# Patient Record
Sex: FEMALE | Race: BLACK OR AFRICAN AMERICAN | Employment: UNEMPLOYED | URBAN - METROPOLITAN AREA
[De-identification: names, ages, dates, MRNs, and addresses within clinical notes are randomized per-mention and may not be internally consistent; named-entity substitution may affect disease eponyms.]

---

## 2017-02-01 DIAGNOSIS — G44.209 HEADACHE, EMOTIONAL TENSION: ICD-10-CM

## 2017-02-01 RX ORDER — ASPIRIN/ACETAMINOPHEN/CAFFEINE 250-250-65
TABLET ORAL
Qty: 50 TAB | Refills: 2 | Status: SHIPPED | OUTPATIENT
Start: 2017-02-01

## 2018-11-01 ENCOUNTER — HOSPITAL ENCOUNTER (EMERGENCY)
Age: 28
Discharge: HOME | End: 2018-11-01
Attending: STUDENT IN AN ORGANIZED HEALTH CARE EDUCATION/TRAINING PROGRAM
Payer: MEDICAID

## 2018-11-01 ENCOUNTER — APPOINTMENT (OUTPATIENT)
Dept: GENERAL RADIOLOGY | Age: 28
End: 2018-11-01
Attending: PHYSICIAN ASSISTANT
Payer: MEDICAID

## 2018-11-01 VITALS
BODY MASS INDEX: 41.45 KG/M2 | HEART RATE: 74 BPM | OXYGEN SATURATION: 100 % | DIASTOLIC BLOOD PRESSURE: 110 MMHG | HEIGHT: 66 IN | RESPIRATION RATE: 16 BRPM | TEMPERATURE: 98 F | WEIGHT: 257.94 LBS | SYSTOLIC BLOOD PRESSURE: 168 MMHG

## 2018-11-01 DIAGNOSIS — M79.671 FOOT PAIN, RIGHT: Primary | ICD-10-CM

## 2018-11-01 DIAGNOSIS — S76.212A GROIN STRAIN, LEFT, INITIAL ENCOUNTER: ICD-10-CM

## 2018-11-01 PROCEDURE — 77030036687 HC SHOE PSTOP S2SG -A

## 2018-11-01 PROCEDURE — 96372 THER/PROPH/DIAG INJ SC/IM: CPT

## 2018-11-01 PROCEDURE — 74011250636 HC RX REV CODE- 250/636: Performed by: PHYSICIAN ASSISTANT

## 2018-11-01 PROCEDURE — 73630 X-RAY EXAM OF FOOT: CPT

## 2018-11-01 PROCEDURE — 99282 EMERGENCY DEPT VISIT SF MDM: CPT

## 2018-11-01 RX ORDER — KETOROLAC TROMETHAMINE 30 MG/ML
60 INJECTION, SOLUTION INTRAMUSCULAR; INTRAVENOUS
Status: COMPLETED | OUTPATIENT
Start: 2018-11-01 | End: 2018-11-01

## 2018-11-01 RX ORDER — NAPROXEN 500 MG/1
500 TABLET ORAL
Qty: 20 TAB | Refills: 0 | Status: SHIPPED | OUTPATIENT
Start: 2018-11-01 | End: 2019-08-08

## 2018-11-01 RX ADMIN — KETOROLAC TROMETHAMINE 60 MG: 30 INJECTION, SOLUTION INTRAMUSCULAR; INTRAVENOUS at 01:58

## 2018-11-01 NOTE — ED PROVIDER NOTES
34 yo F with hx of asthma and HTN here for evaluation of right foot pain. States she has been having pain and swelling to area over the past few weeks. Worse with ambulating and standing; states worse after taking kids trick or treating tonight. No lower leg/calf pain. States also some pain to L groin; feels as if she \"pulled\" it. Denies fall. No additional symptoms. The history is provided by the patient. Foot Pain This is a recurrent problem. The current episode started more than 2 days ago. The problem occurs constantly. The problem has been gradually worsening. The pain is present in the right foot. The quality of the pain is described as aching. The pain is at a severity of 7/10. The pain is moderate. The symptoms are aggravated by palpation, activity, standing and movement. Past Medical History:  
Diagnosis Date  Asthma  Hypertension History reviewed. No pertinent surgical history. Family History:  
Problem Relation Age of Onset  No Known Problems Mother  No Known Problems Father  Diabetes Maternal Grandmother  Hypertension Maternal Grandmother Social History Socioeconomic History  Marital status: SINGLE Spouse name: Not on file  Number of children: Not on file  Years of education: Not on file  Highest education level: Not on file Social Needs  Financial resource strain: Not on file  Food insecurity - worry: Not on file  Food insecurity - inability: Not on file  Transportation needs - medical: Not on file  Transportation needs - non-medical: Not on file Occupational History  Not on file Tobacco Use  Smoking status: Never Smoker  Smokeless tobacco: Never Used Substance and Sexual Activity  Alcohol use: Yes Comment: occ  Drug use: No  
 Sexual activity: Yes  
  Partners: Male Birth control/protection: Condom, IUD Other Topics Concern  Not on file Social History Narrative  Not on file ALLERGIES: Patient has no known allergies. Review of Systems Constitutional: Negative for activity change and fever. HENT: Negative for facial swelling. Eyes: Negative for discharge. Respiratory: Negative for cough. Cardiovascular: Negative for leg swelling. Gastrointestinal: Negative for abdominal distention. Musculoskeletal: Positive for myalgias. Skin: Negative for wound. Neurological: Negative for seizures and syncope. Psychiatric/Behavioral: Negative for behavioral problems. Vitals:  
 11/01/18 0011 BP: (!) 153/99 Pulse: 99 Resp: 16 Temp: 98.1 °F (36.7 °C) SpO2: 99% Weight: 117 kg (257 lb 15 oz) Height: 5' 6\" (1.676 m) Physical Exam  
Constitutional: She is oriented to person, place, and time. She appears well-nourished. No distress. HENT:  
Head: Normocephalic and atraumatic. Eyes: Pupils are equal, round, and reactive to light. Neck: Normal range of motion. Cardiovascular: Normal rate and regular rhythm. Pulses: 
     Dorsalis pedis pulses are 2+ on the right side. Pulmonary/Chest: Effort normal and breath sounds normal.  
Abdominal: Soft. There is no tenderness. Musculoskeletal: Normal range of motion. She exhibits no edema or tenderness. Right knee: Normal.  
     Right lower leg: Normal.  
     Right foot: There is swelling. There is no bony tenderness. Neurological: She is alert and oriented to person, place, and time. Skin: Skin is warm and dry. Psychiatric: She has a normal mood and affect. Nursing note and vitals reviewed. MDM Number of Diagnoses or Management Options Foot pain, right:  
Groin strain, left, initial encounter:  
  
Amount and/or Complexity of Data Reviewed Tests in the radiology section of CPT®: ordered and reviewed Discuss the patient with other providers: yes Independent visualization of images, tracings, or specimens: yes Procedures Patient has been reassessed. Reviewed medications and radiographics with patient. Ready to discharge home. Will have podiatry followup. Discussed case with attending Physician Renetta Jara; reviewed xrays. Agrees with care and will D/C with follow up. Patient's results have been reviewed with them. Patient and/or family have verbally conveyed their understanding and agreement of the patient's signs, symptoms, diagnosis, treatment and prognosis and additionally agree to follow up as recommended or return to the Emergency Room should their condition change prior to follow-up. Discharge instructions have also been provided to the patient with some educational information regarding their diagnosis as well a list of reasons why they would want to return to the ER prior to their follow-up appointment should their condition change.  
Bernida [place], PA

## 2018-11-01 NOTE — DISCHARGE INSTRUCTIONS
Foot Pain: Care Instructions  Your Care Instructions  Foot injuries that cause pain and swelling are fairly common. Almost all sports or home repair projects can cause a misstep that ends up as foot pain. Normal wear and tear, especially as you get older, also can cause foot pain. Most minor foot injuries will heal on their own, and home treatment is usually all you need to do. If you have a severe injury, you may need tests and treatment. Follow-up care is a key part of your treatment and safety. Be sure to make and go to all appointments, and call your doctor if you are having problems. It's also a good idea to know your test results and keep a list of the medicines you take. How can you care for yourself at home? · Take pain medicines exactly as directed. ? If the doctor gave you a prescription medicine for pain, take it as prescribed. ? If you are not taking a prescription pain medicine, ask your doctor if you can take an over-the-counter medicine. · Rest and protect your foot. Take a break from any activity that may cause pain. · Put ice or a cold pack on your foot for 10 to 20 minutes at a time. Put a thin cloth between the ice and your skin. · Prop up the sore foot on a pillow when you ice it or anytime you sit or lie down during the next 3 days. Try to keep it above the level of your heart. This will help reduce swelling. · Your doctor may recommend that you wrap your foot with an elastic bandage. Keep your foot wrapped for as long as your doctor advises. · If your doctor recommends crutches, use them as directed. · Wear roomy footwear. · As soon as pain and swelling end, begin gentle exercises of your foot. Your doctor can tell you which exercises will help. When should you call for help? Call 911 anytime you think you may need emergency care.  For example, call if:    · Your foot turns pale, white, blue, or cold.    Call your doctor now or seek immediate medical care if:    · You cannot move or stand on your foot.     · Your foot looks twisted or out of its normal position.     · Your foot is not stable when you step down.     · You have signs of infection, such as:  ? Increased pain, swelling, warmth, or redness. ? Red streaks leading from the sore area. ? Pus draining from a place on your foot. ? A fever.     · Your foot is numb or tingly.    Watch closely for changes in your health, and be sure to contact your doctor if:    · You do not get better as expected.     · You have bruises from an injury that last longer than 2 weeks. Where can you learn more? Go to http://cathleen-irina.info/. Enter K078 in the search box to learn more about \"Foot Pain: Care Instructions. \"  Current as of: November 29, 2017  Content Version: 11.8  © 8499-7174 [employer]. Care instructions adapted under license by [employer] (which disclaims liability or warranty for this information). If you have questions about a medical condition or this instruction, always ask your healthcare professional. [address] any warranty or liability for your use of this information.

## 2018-11-01 NOTE — ED TRIAGE NOTES
Patient arrives ambulatory from home with c/o R foot pain and swelling. Pain has been intermittent x1 month and worsened tonight after walking around. Pt also reports L hip pain that began tonight after walking.

## 2018-11-01 NOTE — LETTER
[address] 
[address] [place], [place] [address]s[facility] [id number] 
[phone] Work/School Note Date: 11/1/2018 To Whom It May concern: Kannan Johnson was seen and treated today in the emergency room by the following provider(s): 
Attending Provider: Merlin Flores MD 
Physician Assistant: JESSICA Cadena. Kannan Johnson may return to work on 11/2. Sincerely, Pamela White

## 2019-07-16 ENCOUNTER — HOSPITAL ENCOUNTER (EMERGENCY)
Age: 29
Discharge: HOME | End: 2019-07-16
Attending: EMERGENCY MEDICINE
Payer: MEDICAID

## 2019-07-16 ENCOUNTER — APPOINTMENT (OUTPATIENT)
Dept: GENERAL RADIOLOGY | Age: 29
End: 2019-07-16
Attending: EMERGENCY MEDICINE
Payer: MEDICAID

## 2019-07-16 VITALS
SYSTOLIC BLOOD PRESSURE: 146 MMHG | HEART RATE: 98 BPM | TEMPERATURE: 98.4 F | BODY MASS INDEX: 42.77 KG/M2 | WEIGHT: 265 LBS | DIASTOLIC BLOOD PRESSURE: 94 MMHG | OXYGEN SATURATION: 99 % | RESPIRATION RATE: 18 BRPM

## 2019-07-16 DIAGNOSIS — L03.116 CELLULITIS OF LEFT LOWER EXTREMITY: Primary | ICD-10-CM

## 2019-07-16 PROCEDURE — 73590 X-RAY EXAM OF LOWER LEG: CPT

## 2019-07-16 PROCEDURE — 99282 EMERGENCY DEPT VISIT SF MDM: CPT

## 2019-07-16 RX ORDER — CLINDAMYCIN HYDROCHLORIDE 300 MG/1
300 CAPSULE ORAL 3 TIMES DAILY
Qty: 21 CAP | Refills: 0 | Status: SHIPPED | OUTPATIENT
Start: 2019-07-16 | End: 2019-07-23

## 2019-07-16 NOTE — ED NOTES
Pt ambulatory out of ED with discharge instructions and prescriptions in hand given by Dr. Ramses Gao; pt verbalized understanding of discharge paperwork and time allotted for questions. VSS. Pt alert and oriented.

## 2019-07-16 NOTE — DISCHARGE INSTRUCTIONS

## 2019-07-16 NOTE — ED PROVIDER NOTES
HPI       33y F here with redness and swelling to the L shin. Hit it on a chair 5 days ago. Now the area has redness around it and is painful. No fever. No vomiting. No pain with ambulation. No injuries elsewhere. No drainage. Past Medical History:   Diagnosis Date    Asthma     Hypertension        No past surgical history on file.       Family History:   Problem Relation Age of Onset    No Known Problems Mother     No Known Problems Father     Diabetes Maternal Grandmother     Hypertension Maternal Grandmother        Social History     Socioeconomic History    Marital status: SINGLE     Spouse name: Not on file    Number of children: Not on file    Years of education: Not on file    Highest education level: Not on file   Occupational History    Not on file   Social Needs    Financial resource strain: Not on file    Food insecurity:     Worry: Not on file     Inability: Not on file    Transportation needs:     Medical: Not on file     Non-medical: Not on file   Tobacco Use    Smoking status: Never Smoker    Smokeless tobacco: Never Used   Substance and Sexual Activity    Alcohol use: Yes     Comment: occ    Drug use: No    Sexual activity: Yes     Partners: Male     Birth control/protection: Condom, IUD   Lifestyle    Physical activity:     Days per week: Not on file     Minutes per session: Not on file    Stress: Not on file   Relationships    Social connections:     Talks on phone: Not on file     Gets together: Not on file     Attends Jehov[employer]'s witness service: Not on file     Active member of club or organization: Not on file     Attends meetings of clubs or organizations: Not on file     Relationship status: Not on file    Intimate partner violence:     Fear of current or ex partner: Not on file     Emotionally abused: Not on file     Physically abused: Not on file     Forced sexual activity: Not on file   Other Topics Concern    Not on file   Social History Narrative    Not on file ALLERGIES: Patient has no known allergies. Review of Systems   Review of Systems   Constitutional: (-) weight loss. HEENT: (-) stiff neck   Eyes: (-) discharge. Respiratory: (-) cough. Cardiovascular: (-) syncope. Gastrointestinal: (-) blood in stool. Genitourinary: (-) hematuria. Musculoskeletal: (-) myalgias. Neurological: (-) seizure. Skin: (-) petechiae  Lymph/Immunologic: (-) enlarged lymph nodes  All other systems reviewed and are negative. There were no vitals filed for this visit. Physical Exam Nursing note and vitals reviewed. Constitutional: oriented to person, place, and time. appears well-developed and well-nourished. No distress. Head: Normocephalic and atraumatic. Sclera anicteric  Nose: No rhinorrhea  Mouth/Throat: Oropharynx is clear and moist. Pharynx normal  Eyes: Conjunctivae are normal. Pupils are equal, round, and reactive to light. Right eye exhibits no discharge. Left eye exhibits no discharge. Neck: Painless normal range of motion. Neck supple. No LAD. Cardiovascular: Normal rate, regular rhythm, normal heart sounds and intact distal pulses. Exam reveals no gallop and no friction rub. No murmur heard. Pulmonary/Chest:  No respiratory distress. No wheezes. No rales. No rhonchi. No increased work of breathing. No accessory muscle use. Good air exchange throughout. Abdominal: soft, non-tender, no rebound or guarding. No hepatosplenomegaly. Normal bowel sounds throughout. Back: no tenderness to palpation, no deformities, no CVA tenderness  Extremities/Musculoskeletal: Normal range of motion. Abrasion to the ant L shin with surrounding erythema and warmth. No drainage or discharge. Some bruising and swelling is present. Distal extremities are neurovasc intact. Lymphadenopathy:   No adenopathy. Neurological:  Alert and oriented to person, place, and time. Coordination normal. CN 2-12 intact. Motor and sensory function intact.   Skin: Skin is warm and dry. No rash noted. No pallor. MDM 33y F here with L shin injury. Will check xray. Will need abx as it looks like there is cellulitis. No systemic signs of illness.        Procedures

## 2019-07-16 NOTE — ED TRIAGE NOTES
Pt arrives with wound to L shin after hitting it on a chair at home on Thursday. Redness to surrounding area.

## 2019-08-08 ENCOUNTER — HOSPITAL ENCOUNTER (OUTPATIENT)
Dept: WOUND CARE | Age: 29
Discharge: HOME | End: 2019-08-08
Payer: MEDICAID

## 2019-08-08 VITALS
SYSTOLIC BLOOD PRESSURE: 136 MMHG | DIASTOLIC BLOOD PRESSURE: 93 MMHG | TEMPERATURE: 97.8 F | HEART RATE: 93 BPM | RESPIRATION RATE: 18 BRPM

## 2019-08-08 PROBLEM — L97.929 IDIOPATHIC CHRONIC VENOUS HYPERTENSION OF LEFT LOWER EXTREMITY WITH ULCER (HCC): Status: ACTIVE | Noted: 2019-08-08

## 2019-08-08 PROBLEM — S81.812A LACERATION OF LEFT LOWER EXTREMITY: Status: ACTIVE | Noted: 2019-08-08

## 2019-08-08 PROBLEM — L97.923 NON-PRESSURE CHRONIC ULCER OF LEFT LOWER LEG WITH NECROSIS OF MUSCLE (HCC): Status: ACTIVE | Noted: 2019-08-08

## 2019-08-08 PROBLEM — I87.312 IDIOPATHIC CHRONIC VENOUS HYPERTENSION OF LEFT LOWER EXTREMITY WITH ULCER (HCC): Status: ACTIVE | Noted: 2019-08-08

## 2019-08-08 PROCEDURE — 74011000250 HC RX REV CODE- 250: Performed by: OTOLARYNGOLOGY

## 2019-08-08 PROCEDURE — 11043 DBRDMT MUSC&/FSCA 1ST 20/<: CPT

## 2019-08-08 RX ORDER — UREA 10 %
LOTION (ML) TOPICAL DAILY
COMMUNITY

## 2019-08-08 RX ORDER — SPIRONOLACTONE 25 MG/1
100 TABLET ORAL DAILY
COMMUNITY

## 2019-08-08 RX ADMIN — Medication: at 15:00

## 2019-08-08 NOTE — DISCHARGE INSTRUCTIONS
Discharge Instructions for  [address] [facility], [address] [place] [address] Avenue  Telephone: [phone]    NAME:  Isidro Field OF BIRTH:  1990  MEDICAL RECORD NUMBER:  [id number]  DATE:  8/8/2019    Wound Cleansing:   Do not scrub or use excessive force. Cleanse wound prior to applying a clean dressing with:  [] Normal Saline [] Keep Wound Dry in Shower    [] Wound Cleanser   [x] Cleanse wound with Mild Soap & Water  [] May Shower at Discharge   [] Other:       Topical Treatments:  Do not apply lotions, creams, or ointments to wound bed unless directed. [x] Apply moisturizing lotion to skin surrounding the wound prior to dressing change.  [] Apply antifungal ointment to skin surrounding the wound prior to dressing change.  [] Apply thin film of moisture barrier ointment to skin immediately around wound. [] Other:       Dressings:           Wound Location left lower leg  [x] Apply Primary Dressing:       [] MediHoney Gel [x] Alginate with Silver aquacel ag [] Alginate   [] Collagen [] Collagen with Silver   [] Santyl with Moisten saline gauze     [] Hydrocolloid   [] MediHoney Alginate [] Foam with Silver   [] Foam   [] Hydrofera Blue    [] Mepilex Border    [] Moisten with Saline [] Hydrogel [] Mepitel     [] Bactroban/Mupirocin [] Polysporin  [] Other:    [] Pack wound loosely with  [] Iodoform   [] Plain Packing  [] Other   [x] Cover and Secure with:     [] Gauze [] Ryland [] Kerlix   [] Ace Wrap [] Cover Roll Tape [x] ABD     [] Other:    Avoid contact of tape with skin. [] Change dressing: [] Daily    [] Every Other Day [] Three times per week   [] Once a week [x] Do Not Change Dressing   [x] Other: in clinic      Compression:  Apply: [x] Multilayer Compression Wrap Applied in Clinic 3 Layer []RightLeg [x]Left Leg   [x] Multi-layer compression. Do not get leg(s) with wrap wet.   If wraps become too tight call the center or completely remove the wrap.      [x] Elevate leg(s) above the level of the heart when sitting. [x] Avoid prolonged standing in one place     Dietary:  [x] Diet as tolerated: [] Calorie Diabetic Diet: [] No Added Salt:  [x] Increase Protein: [] Other:   Activity:  [x] Activity as tolerated:  [] Patient has no activity restrictions     [] Strict Bedrest: [] Remain off Work:     [] May return to full duty work:                                   [] Return to work with restrictions:             Return Appointment:  [] Wound and dressing supply provider:   [] ECF or Home Healthcare:  [] Wound Assessment: [] Physician or NP scheduled for Wound Assessment:   [x] Return Appointment: With Dr. Rosio Tapia  in  [facility]) RN visit 8/12/19 for wrap change  [] Ordered tests:      Electronically signed on 8/8/2019 at 2:34 PM     Lore[address]: Should you experience any significant changes in your wound(s) or have questions about your wound care, please contact the [facility] at [address] [place] [address] 8:00 am - 4:30. If you need help with your wound outside these hours and cannot wait until we are again available, contact your PCP or go to the hospital emergency room. PLEASE NOTE: IF YOU ARE UNABLE TO OBTAIN WOUND SUPPLIES, CONTINUE TO USE THE SUPPLIES YOU HAVE AVAILABLE UNTIL YOU ARE ABLE TO REACH US. IT IS MOST IMPORTANT TO KEEP THE WOUND COVERED AT ALL TIMES.      Physician Signature:_______________________    Date: ___________ Time:  ____________

## 2019-08-08 NOTE — H&P
[facility]  HISTORY AND PHYSICAL    Name:  Parul Warren  MR#:  [id number]  :  1990  ACCOUNT #:  [de-identified]  ADMIT DATE:  2019      HISTORY OF PRESENT ILLNESS:  The patient is a 54-year-old woman who walked into a chair in her bedroom on  and struck her left leg. She then went to the emergency room on  for erythema and increasing tenderness. X-ray showed no fracture or other acute osseous, articular, or soft tissue abnormalities. She was treated with clindamycin 300 mg 3 times a day for 7 days and that course has been completed. PAST MEDICAL HISTORY:  Hypertension, asthma, anxiety. PAST SURGICAL HISTORY:  IUD insertion. MEDICATIONS:  Saxenda and Zoloft. FAMILY HISTORY:  Diabetes mellitus in her maternal grandmother, hypertension in maternal grandmother    SOCIAL HISTORY:  The patient denies smoking cigarettes but she does consume alcohol. ALLERGIES:  SHE HAS NO KNOWN DRUG ALLERGIES. WOUND EXAMINATION:  The patient has been treating this wound with triple antibiotic ointment. She is very aware that she has had persistent edema for quite a long while. PHYSICAL EXAMINATION:  VITAL SIGNS:  The patients 5 feet 5 inches in height, weight is 248 pounds which is a BMI of 41.4. Temperature is 97.8, pulse 93, respirations 18, blood pressure 136/93. NEUROLOGIC:  Cranial nerves II through XII grossly intact, other than a slight lid lag on the left side. HEAD AND NECK:  Oral cavity, oropharynx, palpation of neck is normal.  LUNGS:  Clear to auscultation and percussion. HEART:  Regular rhythm without murmur. ABDOMEN:  Soft and nontender, no organomegaly. BACK:  Nontender to palpation. EXTREMITIES:  Upper extremities, equal tone and strength bilaterally. Lower extremities: she has nice palpable dorsalis pedis pulses. She refused an OBDULIO due to pain in the wound of the left anterior leg. She does have pitting edema bilaterally.   Left anterior leg has a wound of 3.4 x 4.6 x 0.5 cm with areas of healthy granulation tissue, areas of healthy muscle, and areas of slough which are quite extensive and extend down to the underlying muscle. I have recommended that the wound be debrided of all devitalized tissue. I explained the risks and benefits. The patient understood and wished to proceed. Therefore, topical Xylocaine 4% gel was applied to the wound for 10 minutes. Using a 5-mm ring curette, all devitalized tissue was removed down to underlying healthy muscle. Upon completion of the procedure, the dimensions increased to 3.5 x 5.0 x 0.6 cm. All devitalized tissue had been removed and only nice healthy fat, fascia, and muscle are visible in the wound. The wound was then scrubbed with chlorhexidine followed by normal saline. ASSESSMENT AND PLAN:  We are going to start with Aquacel Ag and a 3-layer wrap. I have recommended that the patient keep her leg elevated at all times. I showed her how to do calf muscle exercises to improve venous outflow. I explained to her that if the wrap hurts or if it becomes saturated or if her toes get dusky, she should remove the wrap. We will plan on seeing her shelter through the week for a nursing visit to simply change the wrap since it might very well be saturated or too loose for her leg at that time. I will see again in followup in 1 week. We will reevaluate her wound at that time and determine whether or not she should stay with the same dressing or switch to a different modality. All questions were answered. Her condition on discharge is stable.         MD SHEYLA [email or web address]  D:  08/08/2019 15:01  T:  08/08/2019 15:09  JOB #:  [id number]

## 2019-08-08 NOTE — WOUND CARE
Visit Vitals BP (!) 136/93 (BP 1 Location: Right arm, BP Patient Position: Sitting) Pulse 93 Temp 97.8 °F (36.6 °C) Resp 18 Breastfeeding? No  
 
 
 08/08/19 1413 Wound Leg lower Anterior Date First Assessed/Time First Assessed: 08/08/19 1413   Present on Hospital Admission: Yes  Primary Wound Type: Trauma  Location: Leg lower  Wound Location Orientation: Anterior Dressing Status Breakthrough drainage Dressing Type Non adherent; Adhesive wound dressing (Band-Aid) Wound Length (cm) 3.4 cm Wound Width (cm) 4.6 cm Wound Depth (cm) 0.5 cm Wound Volume (cm^3) 7.82 cm^3 Condition of Base Pink;Granulation;Eschar  
Condition of Edges Closed Drainage Amount Small Drainage Color Serosanguinous Wound Odor None Kari-wound Assessment Edema; Non-blanchable erythema Cleansing and Cleansing Agents  Normal saline

## 2019-08-22 ENCOUNTER — HOSPITAL ENCOUNTER (OUTPATIENT)
Dept: WOUND CARE | Age: 29
Discharge: HOME | End: 2019-08-22
Payer: MEDICAID

## 2019-08-22 VITALS
RESPIRATION RATE: 18 BRPM | TEMPERATURE: 98.5 F | SYSTOLIC BLOOD PRESSURE: 139 MMHG | HEART RATE: 97 BPM | DIASTOLIC BLOOD PRESSURE: 108 MMHG

## 2019-08-22 PROCEDURE — 74011000250 HC RX REV CODE- 250: Performed by: OTOLARYNGOLOGY

## 2019-08-22 PROCEDURE — 11042 DBRDMT SUBQ TIS 1ST 20SQCM/<: CPT

## 2019-08-22 RX ADMIN — Medication: at 14:00

## 2019-08-22 NOTE — PROGRESS NOTES
08/22/19 1310   Wound Leg lower Anterior   Date First Assessed/Time First Assessed: 08/08/19 1413   Present on Hospital Admission: Yes  Primary Wound Type: Trauma  Location: Leg lower  Wound Location Orientation: Anterior   Wound Length (cm) 3.2 cm   Wound Width (cm) 4 cm   Wound Depth (cm) 0.5 cm   Wound Volume (cm^3) 6.4 cm^3   Condition of Base Pink;Slough   Drainage Amount Small   Drainage Color Serosanguinous   Wound Odor None   Cleansing and Cleansing Agents  Dermal wound cleanser     Visit Vitals  BP (!) 139/108   Pulse 97   Temp 98.5 °F (36.9 °C)   Resp 18

## 2019-08-22 NOTE — WOUND CARE
08/22/19 1336   Wound Leg lower Anterior   Date First Assessed/Time First Assessed: 08/08/19 1413   Present on Hospital Admission: Yes  Primary Wound Type: Trauma  Location: Leg lower  Wound Location Orientation: Anterior   Dressing Type Applied Alginate; Compression Wrap/Venous Stasis       Discharge Condition: stable  Ambulatory Status: ambulatory  Discharge Destination: home  Transportation:  Personal car  Accompanied by:  self

## 2019-08-22 NOTE — DISCHARGE INSTRUCTIONS
Discharge Instructions for  [address] [facility], [facility] [address]  Telephone: [phone]    NAME:  Tamara Choi OF BIRTH:  1990  MEDICAL RECORD NUMBER:  [id number]  DATE:  8/22/2019    Wound Cleansing:   Do not scrub or use excessive force. Cleanse wound prior to applying a clean dressing with:  [] Normal Saline [] Keep Wound Dry in Shower    [] Wound Cleanser   [x] Cleanse wound with Mild Soap & Water  [] May Shower at Discharge   [] Other:       Topical Treatments:  Do not apply lotions, creams, or ointments to wound bed unless directed. [x] Apply moisturizing lotion to skin surrounding the wound prior to dressing change.  [] Apply antifungal ointment to skin surrounding the wound prior to dressing change.  [] Apply thin film of moisture barrier ointment to skin immediately around wound. [] Other:       Dressings:           Wound Location left lower leg  [] Apply Primary Dressing:       [] MediHoney Gel [x] Alginate with Silver [] Alginate   [] Collagen [] Collagen with Silver   [] Santyl with Moisten saline gauze     [] Hydrocolloid   [] MediHoney Alginate [] Foam with Silver   [] Foam   [] Hydrofera Blue    [] Mepilex Border    [] Moisten with Saline [] Hydrogel [] Mepitel     [] Bactroban/Mupirocin [] Polysporin  [] Other:    [] Pack wound loosely with  [] Iodoform   [] Plain Packing  [] Other   [] Cover and Secure with:     [] Gauze [] Ryland [] Kerlix   [] Ace Wrap [] Cover Roll Tape [x] ABD     [] Other:    Avoid contact of tape with skin. [] Change dressing: [] Daily    [] Every Other Day [] Three times per week   [x] Once a week [] Do Not Change Dressing   [] Other:        Compression:  Apply: [x] Multilayer Compression Wrap Applied in Clinic 3 layer wrap []RightLeg []Left Leg   [] Multi-layer compression. Do not get leg(s) with wrap wet. If wraps become too tight call the center or completely remove the wrap.       [x] Elevate leg(s) above the level of the heart when sitting. [x] Avoid prolonged standing in one place. OfDietary:  [x] Diet as tolerated: [] Calorie Diabetic Diet: [] No Added Salt:  [] Increase Protein: [] Other:   Activity:  [x] Activity as tolerated:  [] Patient has no activity restrictions     [] Strict Bedrest: [] Remain off Work:     [] May return to full duty work:                                   [] Return to work with restrictions:             Return Appointment:  [] Wound and dressing supply provider:   [] ECF or Home Healthcare:  [] Wound Assessment: [] Physician or NP scheduled for Wound Assessment:   [x] Return Appointment: With Viktoriya Pryor  in  1 [facility])  [] Ordered tests:      Electronically signed on 8/22/2019 at 1:19 PM     215 [facility], [place]) Information: Should you experience any significant changes in your wound(s) or have questions about your wound care, please contact the [facility] Main at [address] [place] [address] 8:00 am - 4:30. If you need help with your wound outside these hours and cannot wait until we are again available, contact your PCP or go to the hospital emergency room. PLEASE NOTE: IF YOU ARE UNABLE TO OBTAIN WOUND SUPPLIES, CONTINUE TO USE THE SUPPLIES YOU HAVE AVAILABLE UNTIL YOU ARE ABLE TO REACH US. IT IS MOST IMPORTANT TO KEEP THE WOUND COVERED AT ALL TIMES.      Physician Signature:_______________________    Date: ___________ Time:  ____________

## 2019-08-22 NOTE — PROGRESS NOTES
[address] [place] [address] NOTE    Name:  Debe Severance  MR#:  [id number]  :  1990  ACCOUNT #:  [de-identified]  DATE OF SERVICE:  2019      SUBJECTIVE:  The patient returns for the first time since she was seen for her history and physical on 2019. She had a laceration of the left leg without foreign body, S81.812D, which extended down to muscle, L97.923, with a venous hypertension ulcer, I87.312. The patient was unable to keep any followup appointments here; therefore she removed the three-layer wrap on Monday, 2019, and since then has been using Aquacel Ag and an Ace wrap. There had been no other problems. No changes in medications, allergies or review of systems. OBJECTIVE:  Her temperature today is 98.5, pulse 97, respirations 18, blood pressure 139/108. WOUND EXAMINATION:  The wound last time measured 3.4 x 4.6 x 0.5 cm. Today, it measures 3.2 x 4 x 0.5 cm. There are areas of greatest depth in the center of the wound extending down to the muscle. There is a layer of slough over the wound, and there is no periwound erythema nor maceration. It was recommended that the wound be debrided. I explained the risks and benefits. The patient understood and wished to proceed. Therefore, topical Xylocaine 4% gel was applied for 10 minutes. Using a 5-mm ring curette, all devitalized tissue was removed down to healthy subcutaneous tissue and fat. Hemostasis was achieved with gentle pressure until dry. The wound was scrubbed with chlorhexidine followed by normal saline. ASSESSMENT AND PLAN:  I reiterated to the patient the need to keep a three-layer wrap in place to allow adequate compression along with leg elevation and calf muscle exercises. Therefore, we are going to apply Aquacel Ag, followed by three-layer wrap which should stay in place until we see the patient again in 1 week. All questions were answered.   Her condition on discharge is stable.         MD SHEYLA [email or web address]  D:  08/22/2019 13:30  T:  08/22/2019 13:38  JOB #:  [id number]

## 2019-08-29 ENCOUNTER — HOSPITAL ENCOUNTER (OUTPATIENT)
Dept: WOUND CARE | Age: 29
Discharge: HOME | End: 2019-08-29
Payer: MEDICAID

## 2019-08-29 VITALS
TEMPERATURE: 98.1 F | HEART RATE: 109 BPM | DIASTOLIC BLOOD PRESSURE: 86 MMHG | SYSTOLIC BLOOD PRESSURE: 136 MMHG | RESPIRATION RATE: 16 BRPM

## 2019-08-29 PROCEDURE — 11042 DBRDMT SUBQ TIS 1ST 20SQCM/<: CPT

## 2019-08-29 NOTE — DISCHARGE INSTRUCTIONS
Discharge Instructions for  [address] [facility], [address] [place] [address] Avenue  Telephone: [phone]    NAME:  Freddy March OF BIRTH:  1990  MEDICAL RECORD NUMBER:  [id number]  DATE:  8/29/2019    Wound Cleansing:   Do not scrub or use excessive force. Cleanse wound prior to applying a clean dressing with:  [] Normal Saline [] Keep Wound Dry in Shower    [] Wound Cleanser   [x] Cleanse wound with Mild Soap & Water  [] May Shower at Discharge   [] Other:       Topical Treatments:  Do not apply lotions, creams, or ointments to wound bed unless directed. [x] Apply moisturizing lotion to skin surrounding the wound prior to dressing change.  [] Apply antifungal ointment to skin surrounding the wound prior to dressing change.  [] Apply thin film of moisture barrier ointment to skin immediately around wound. [] Other:       Dressings:           Wound Location left lower leg  [x] Apply Primary Dressing:       [] MediHoney Gel [] Alginate with Silver [] Alginate   [] Collagen [x] Collagen with Silver leslie ag   [] Santyl with Moisten saline gauze     [] Hydrocolloid   [] MediHoney Alginate [] Foam with Silver   [] Foam   [x] Hydrofera Blue ready [] Mepilex Border    [] Moisten with Saline [] Hydrogel [] Mepitel     [] Bactroban/Mupirocin [] Polysporin  [] Other:    [x] Cover and Secure with:     [] Gauze [] Ryland [] Kerlix   [] Ace Wrap [] Cover Roll Tape [x] ABD     [] Other:    Avoid contact of tape with skin. [x] Change dressing: [] Daily    [] Every Other Day [] Three times per week   [x] Once a week [] Do Not Change Dressing   [] Other:    Compression:  Apply: [x] Multilayer Compression Wrap Applied in Clinic 3 layer []RightLeg [x]Left Leg   [x] Multi-layer compression. Do not get leg(s) with wrap wet. If wraps become too tight call the center or completely remove the wrap.       [x] Elevate leg(s) above the level of the heart when sitting. [x] Avoid prolonged standing in one place. Dietary:  [x] Diet as tolerated: [] Calorie Diabetic Diet: [] No Added Salt:  [x] Increase Protein: [] Other:   Activity:  [x] Activity as tolerated:  [] Patient has no activity restrictions     [] Strict Bedrest: [] Remain off Work:     [] May return to full duty work:                                   [] Return to work with restrictions:             Return Appointment:  [] Wound and dressing supply provider:   [] ECF or Home Healthcare:  [x] Wound Assessment: RN visit for wrap change [] Physician or NP scheduled for Wound Assessment:   [x] Return Appointment: With Dr. Marcelo Cordon  in  2 [facility])  [] Ordered tests:      Electronically signed on 8/29/2019 at 1:18 PM     215 [facility] Information: Should you experience any significant changes in your wound(s) or have questions about your wound care, please contact the [facility] Main at [address] [place] [address] 8:00 am - 4:30. If you need help with your wound outside these hours and cannot wait until we are again available, contact your PCP or go to the hospital emergency room. PLEASE NOTE: IF YOU ARE UNABLE TO OBTAIN WOUND SUPPLIES, CONTINUE TO USE THE SUPPLIES YOU HAVE AVAILABLE UNTIL YOU ARE ABLE TO REACH US. IT IS MOST IMPORTANT TO KEEP THE WOUND COVERED AT ALL TIMES.       Physician Signature:_______________________    Date: ___________ Time:  ____________

## 2019-08-29 NOTE — WOUND CARE
Visit Vitals  /86 (BP 1 Location: Right arm, BP Patient Position: Sitting)   Pulse (!) 109   Temp 98.1 °F (36.7 °C)   Resp 16        08/29/19 1310   Wound Leg lower Anterior   Date First Assessed/Time First Assessed: 08/08/19 1413   Present on Hospital Admission: Yes  Primary Wound Type: Trauma  Location: Leg lower  Wound Location Orientation: Anterior   Wound Length (cm) 3 cm   Wound Width (cm) 4 cm   Wound Depth (cm) 0.4 cm   Wound Volume (cm^3) 4.8 cm^3   Condition of Base Granulation   Drainage Amount Small   Drainage Color Serosanguinous   Wound Odor None   Kari-wound Assessment Induration   Cleansing and Cleansing Agents  Normal saline

## 2019-08-29 NOTE — PROGRESS NOTES
[address] [place] [address] PROGRESS NOTE    Name:  Radha Keita  MR#:  [id number]  :  1990  ACCOUNT #:  [de-identified]  DATE OF SERVICE:  2019      SUBJECTIVE:  The patient returns for treatment of a laceration without foreign body in the left leg, S81.812D, with a venous hypertension ulcer, I87.312, which extended down to muscle, L97.923. Last week, we wrapped the leg with a 3-layer wrap. She kept it on for most of the week and removed it yesterday. She has been trying to keep her legs elevated as much as possible and do gastrocnemius muscle exercises. She removed the 3-layer wrap because it was too tight, which I explained to her it was due to keeping her leg in a dependent position. She had no other problems over the week. No changes in medications, allergies, or review of systems. OBJECTIVE:  VITAL SIGNS:  Her temperature is 98.1, pulse 109, respirations 16, blood pressure 136/86. WOUND EXAMINATION:  The wound dimensions last week were 3.2 x 4 x 0.5 cm. Today, it is improved in all dimensions at 3 x 4 x 0.4 cm. There is exposed fat and between the fat, there is devitalized tissue in the deepest portion of the wound. That said ,there is a large portion of the wound which is nice healthy, red granulation tissue. It is recommended that the wound be debrided of all devitalized tissue. I explained the risks and benefits. The patient understood and wished to proceed. Therefore, topical Xylocaine 4% gel was applied for 10 minutes. Using a 5-mm ring curette, all devitalized tissue was removed from the depths of the wound down to subcutaneous tissue, essentially down to muscle. The edges were lightly stripped out to get back to healthy actively growing cells. Post debridement dimensions were 3.1 x 4.1 x 0.5 cm. The wound was scrubbed with chlorhexidine followed by normal saline.     ASSESSMENT AND PLAN:  We are going to switch from Aquacel to Ayla Ag and Hydrofera Blue followed by a 3-layer wrap. The patient will keep her legs elevated. She will do calf muscle exercises hourly while awake. I am asking the patient to purchase 20-30 mmHg stockings to start using on the right leg, which is edematous. She understands that if she has to remove her 3-layer wrap, she will do so and apply the 20-30 mmHg stockings. She can get open toe or closed toe. She wants to get a pedicure, and I recommended that she have this done next Thursday morning prior to her visit, so that she can remove her wrap, have a pedicure and present here for her exam and dressing change later that day. She will have a nursing visit in 1 week and a followup exam with me in 2 weeks. All questions were answered. Her condition on discharge is stable.         Jagdeep Boudreaux MD      [email or web address]  D:  08/29/2019 13:29  T:  08/29/2019 13:37  JOB #:  [id number]

## 2019-08-30 NOTE — WOUND CARE
08/29/19 1345   Wound Leg lower Anterior   Date First Assessed/Time First Assessed: 08/08/19 1413   Present on Hospital Admission: Yes  Primary Wound Type: Trauma  Location: Leg lower  Wound Location Orientation: Anterior   Dressing Type Applied ABD pad;Collagens/cell matrix; Compression Wrap/Venous Stasis; Foam   Procedure Tolerated Well   Discharge Condition:Stable  Ambulatory Status:ambulatory  Discharge Destination:home  Transportation: private auto  Accompanied by: children

## 2019-09-05 ENCOUNTER — HOSPITAL ENCOUNTER (OUTPATIENT)
Dept: WOUND CARE | Age: 29
Discharge: HOME | End: 2019-09-05
Payer: MEDICAID

## 2019-09-05 VITALS
TEMPERATURE: 98 F | SYSTOLIC BLOOD PRESSURE: 127 MMHG | DIASTOLIC BLOOD PRESSURE: 88 MMHG | HEART RATE: 80 BPM | RESPIRATION RATE: 18 BRPM

## 2019-09-05 PROCEDURE — 29581 APPL MULTLAYER CMPRN SYS LEG: CPT

## 2019-09-05 NOTE — WOUND CARE
09/05/19 1131   Wound Leg lower Anterior; Left   Date First Assessed/Time First Assessed: 08/08/19 1413   Present on Hospital Admission: Yes  Primary Wound Type: Trauma  Location: Leg lower  Wound Location Orientation: Anterior; Left   Dressing Status Old drainage   Dressing Type Compression Wrap/Venous Stasis;Collagens/cell matrix  (HFBR)   Wound Length (cm) 2.8 cm   Wound Width (cm) 3.7 cm   Wound Depth (cm) 0.4 cm   Wound Volume (cm^3) 4.14 cm^3   Condition of Base Granulation   Drainage Amount Small   Drainage Color Serosanguinous   Wound Odor None   Kari-wound Assessment Hyperpigmented   Dressing Type Applied ABD pad;Compression Wrap/Venous Stasis;Collagens/cell matrix  (HFBR)   Procedure Tolerated Well     Visit Vitals  /88   Pulse 80   Temp 98 °F (36.7 °C)   Resp 18

## 2019-09-12 ENCOUNTER — HOSPITAL ENCOUNTER (OUTPATIENT)
Dept: WOUND CARE | Age: 29
Discharge: HOME | End: 2019-09-12
Payer: MEDICAID

## 2019-09-12 VITALS
SYSTOLIC BLOOD PRESSURE: 132 MMHG | HEART RATE: 109 BPM | TEMPERATURE: 98.5 F | RESPIRATION RATE: 16 BRPM | DIASTOLIC BLOOD PRESSURE: 85 MMHG

## 2019-09-12 PROCEDURE — 11042 DBRDMT SUBQ TIS 1ST 20SQCM/<: CPT

## 2019-09-12 PROCEDURE — 74011000250 HC RX REV CODE- 250: Performed by: OTOLARYNGOLOGY

## 2019-09-12 RX ADMIN — Medication: at 15:00

## 2019-09-12 NOTE — WOUND CARE
09/12/19 1402   Wound Leg lower Anterior; Left   Date First Assessed/Time First Assessed: 08/08/19 1413   Present on Hospital Admission: Yes  Primary Wound Type: Trauma  Location: Leg lower  Wound Location Orientation: Anterior; Left   Wound Length (cm) 1.5 cm   Wound Width (cm) 2.5 cm   Wound Depth (cm) 0.2 cm   Wound Volume (cm^3) 0.75 cm^3   Condition of Base Granulation   Drainage Amount Small   Drainage Color Serosanguinous   Wound Odor None   Kari-wound Assessment Hyperpigmented   Cleansing and Cleansing Agents  Soap and water     Visit Vitals  /85   Pulse (!) 109   Temp 98.5 °F (36.9 °C)   Resp 16

## 2019-09-12 NOTE — WOUND CARE
09/12/19 1435   Wound Leg lower Anterior; Left   Date First Assessed/Time First Assessed: 08/08/19 1413   Present on Hospital Admission: Yes  Primary Wound Type: Trauma  Location: Leg lower  Wound Location Orientation: Anterior; Left   Dressing Type Applied ABD pad;Collagens/cell matrix; Absorptive  (Hfbr)     Discharge Condition:stable  Ambulatory Status:walking  Discharge Destination:home  Transportation: private  Accompanied by: self

## 2019-09-12 NOTE — PROGRESS NOTES
[address] [place] [address] PROGRESS NOTE    Name:  Nara Patel  MR#:  [id number]  :  1990  ACCOUNT #:  [de-identified]  DATE OF SERVICE:  2019      SUBJECTIVE:  The patient returns for treatment of a laceration without foreign body of the left leg, S81.812D with a venous hypertension ulcer, I87.312, which goes down to muscle, L97.923. On , the patient had a wound of 3 x 4 x 0.4 cm. We switched her dressing to Ayla Ag and Hydrofera blue and a three-layer wrap. Last week for her nursing visit, the wound improved to 2.8 x 3.7 x 0.4 cm. She has had a good 2 weeks. She has had no problems and no changes in medications, allergies, or review of systems. OBJECTIVE:  VITAL SIGNS:  Her temperature today is 98.5, pulse 109, respirations 16, blood pressure 132/85. WOUND EXAMINATION:  The wound dimensions today are improved to 1.5 x 2.5 x 0.2 cm. Most of the wound is healthy granulation tissue. There are some clefts between the granulation tissue down to subcutaneous tissue, which were filled with slough. It was recommended that the wound be debrided. I explained the risks and benefits. The patient understood and wished to proceed. Therefore, topical Xylocaine 4% gel was applied for 10 minutes. Using a 5-mm ring curette, the wound was debrided of all devitalized tissue. Nice healthy bleeding subcutaneous tissue was encountered. Post-debridement dimensions were 1.6 x 2.5 x 0.2 cm. The wound was then scrubbed with chlorhexidine followed by normal saline. ASSESSMENT AND PLAN:  The patient is doing very well on current therapy. Therefore, we are going to continue Ayla Ag, Hydrofera blue, and a three-layer wrap along with leg elevation and calf muscle exercises.   Again, I asked the patient to purchase 20-30 mmHg compression stockings to start wearing on her right leg since she is edematous on that side and I also wanted her to be comfortable using the stockings, so that when she is able to be converted to that modality on the left side, she will already be quite comfortable using it. She will have a nursing visit in 1 week and she will see me in 2 weeks.         Shira Cavazos MD      [id number]  D:  09/12/2019 14:42  T:  09/12/2019 14:50  JOB #:  [id number]

## 2019-09-12 NOTE — DISCHARGE INSTRUCTIONS
Discharge Instructions for  [facility]. [address] [facility], [address] [place] [address] Avenue  Telephone: [phone]    NAME:  Gloria Scheuermann OF BIRTH:  1990  MEDICAL RECORD NUMBER:  [id number]  DATE:  9/12/2019    Wound Cleansing:   Do not scrub or use excessive force. Cleanse wound prior to applying a clean dressing with:  [] Normal Saline [] Keep Wound Dry in Shower    [] Wound Cleanser   [x] Cleanse wound with Mild Soap & Water  [] May Shower at Discharge   [] Other:       Topical Treatments:  Do not apply lotions, creams, or ointments to wound bed unless directed. [x] Apply moisturizing lotion to skin surrounding the wound prior to dressing change.  [] Apply antifungal ointment to skin surrounding the wound prior to dressing change.  [] Apply thin film of moisture barrier ointment to skin immediately around wound. [] Other:       Dressings:           Wound Location left lower leg  [x] Apply Primary Dressing:       [] MediHoney Gel [] Alginate with Silver [] Alginate   [] Collagen [x] Collagen with Silver   [] Santyl with Moisten saline gauze     [] Hydrocolloid   [] MediHoney Alginate [] Foam with Silver   [] Foam   [x] Hydrofera Blue ready    [] Mepilex Border    [] Moisten with Saline [] Hydrogel [] Mepitel     [] Bactroban/Mupirocin [] Polysporin  [] Other:     [] Cover and Secure with:     [] Gauze [] Ryland [] Kerlix   [] Ace Wrap [] Cover Roll Tape [x] ABD     [] Other:    Avoid contact of tape with skin. [x] Change dressing: [] Daily    [] Every Other Day [] Three times per week   [x] Once a week [] Do Not Change Dressing   [] Other:    Compression:  Apply: [x] Multilayer Compression Wrap Applied in Clinic 3 layer wrap []RightLeg [x]Left Leg   [x] Multi-layer compression. Do not get leg(s) with wrap wet. If wraps become too tight call the center or completely remove the wrap. [x] Elevate leg(s) above the level of the heart when sitting.     [x] Avoid prolonged standing in one place. Dietary:  [x] Diet as tolerated: [] Calorie Diabetic Diet: [] No Added Salt:  [x] Increase Protein: [] Other:   Activity:  [x] Activity as tolerated:  [] Patient has no activity restrictions     [] Strict Bedrest: [] Remain off Work:     [] May return to full duty work:                                   [] Return to work with restrictions:             Return Appointment:  [x] Wound and dressing supply provider: Please purchase knee high 20-30mmhg compression stockings size large ([facility])  [] ECF or Home Healthcare:  [x] Wound Assessment: RN visit 1 week [] Physician or NP scheduled for Wound Assessment:   [x] Return Appointment: With Dr. Bob Townsend  in  [address] [place] [address])  [] Ordered tests:      Electronically signed on 9/12/2019 at 2:20 PM     215 [facility] Information: Should you experience any significant changes in your wound(s) or have questions about your wound care, please contact the [facility] Main at [address] [place] [address] 8:00 am - 4:30. If you need help with your wound outside these hours and cannot wait until we are again available, contact your PCP or go to the hospital emergency room. PLEASE NOTE: IF YOU ARE UNABLE TO OBTAIN WOUND SUPPLIES, CONTINUE TO USE THE SUPPLIES YOU HAVE AVAILABLE UNTIL YOU ARE ABLE TO REACH US. IT IS MOST IMPORTANT TO KEEP THE WOUND COVERED AT ALL TIMES.       Physician Signature:_______________________    Date: ___________ Time:  ____________

## 2019-09-19 ENCOUNTER — HOSPITAL ENCOUNTER (OUTPATIENT)
Dept: WOUND CARE | Age: 29
Discharge: HOME | End: 2019-09-19
Payer: MEDICAID

## 2019-09-19 VITALS
RESPIRATION RATE: 16 BRPM | HEART RATE: 89 BPM | SYSTOLIC BLOOD PRESSURE: 152 MMHG | DIASTOLIC BLOOD PRESSURE: 105 MMHG | TEMPERATURE: 98.1 F

## 2019-09-19 PROCEDURE — 29581 APPL MULTLAYER CMPRN SYS LEG: CPT

## 2019-09-19 NOTE — WOUND CARE
Nurse visit for wrap change Visit Vitals BP (!) 152/105 (BP 1 Location: Right arm, BP Patient Position: Sitting) Pulse 89 Temp 98.1 °F (36.7 °C) Resp 16  
 
 
 09/19/19 1351 Wound Leg lower Anterior; Left Date First Assessed/Time First Assessed: 08/08/19 1413   Present on Hospital Admission: Yes  Primary Wound Type: Trauma  Location: Leg lower  Wound Location Orientation: Anterior; Left Dressing Status Breakthrough drainage Dressing Type Collagens/cell matrix; Adhesive wound dressing (Band-Aid) Drainage Amount Small Drainage Color Serosanguinous Wound Odor None Kari-wound Assessment Intact Cleansing and Cleansing Agents  Normal saline Dressing Type Applied Collagens/cell matrix;Foam;Compression Wrap/Venous Stasis Discharge Condition:stab;e Ambulatory Status: ambulatory Discharge Destination: home Transportation:  Personal car Accompanied by:  self

## 2019-09-26 ENCOUNTER — HOSPITAL ENCOUNTER (OUTPATIENT)
Dept: WOUND CARE | Age: 29
Discharge: HOME | End: 2019-09-26
Payer: MEDICAID

## 2019-09-26 VITALS
RESPIRATION RATE: 16 BRPM | DIASTOLIC BLOOD PRESSURE: 113 MMHG | SYSTOLIC BLOOD PRESSURE: 143 MMHG | TEMPERATURE: 97.6 F | HEART RATE: 92 BPM

## 2019-09-26 PROCEDURE — 29581 APPL MULTLAYER CMPRN SYS LEG: CPT

## 2019-09-26 PROCEDURE — 74011000250 HC RX REV CODE- 250: Performed by: OTOLARYNGOLOGY

## 2019-09-26 RX ADMIN — Medication: at 11:30

## 2019-09-26 NOTE — WOUND CARE
09/26/19 1126 Wound Leg lower Anterior; Left Date First Assessed/Time First Assessed: 08/08/19 1413   Present on Hospital Admission: Yes  Primary Wound Type: Trauma  Location: Leg lower  Wound Location Orientation: Anterior; Left Dressing Status Old drainage Shape 0.5 Wound Length (cm) 0.8 cm Wound Width (cm) 0.1 cm Condition of Base Pink Drainage Amount Scant Drainage Color Serosanguinous Wound Odor None Cleansing and Cleansing Agents  Normal saline Blood pressure (!) 143/113, pulse 92, temperature 97.6 °F (36.4 °C), resp. rate 16.

## 2019-09-26 NOTE — DISCHARGE INSTRUCTIONS
Discharge Instructions for  [address] [facility], [address] [place] [address] Avenue  Telephone: [phone]    NAME:  Shakira Barbosa OF BIRTH:  1990  MEDICAL RECORD NUMBER:  [id number]  DATE:  9/26/2019    Wound Cleansing:   Do not scrub or use excessive force. Cleanse wound prior to applying a clean dressing with:  [] Normal Saline [x] Keep Wound Dry in Shower    [] Wound Cleanser   [x] Cleanse wound with Mild Soap & Water  [] May Shower at Discharge   [] Other:       Topical Treatments:  Do not apply lotions, creams, or ointments to wound bed unless directed. [x] Apply moisturizing lotion to skin surrounding the wound prior to dressing change.  [] Apply antifungal ointment to skin surrounding the wound prior to dressing change.  [] Apply thin film of moisture barrier ointment to skin immediately around wound. [] Other:       Dressings:           Wound Location left lower leg  [x] Apply Primary Dressing:       [] MediHoney Gel [] Alginate with Silver [] Alginate   [] Collagen [x] Collagen with Silver primsa ag  [] Santyl with Moisten saline gauze     [] Hydrocolloid   [] MediHoney Alginate [] Foam with Silver   [] Foam   [x] Hydrofera Blue ready [] Mepilex Border    [] Moisten with Saline [] Hydrogel [] Mepitel     [] Bactroban/Mupirocin [] Polysporin  [] Other:    [x] Cover and Secure with:     [x] Gauze [] Ryland [] Kerlix   [] Ace Wrap [] Cover Roll Tape [] ABD     [] Other:    Avoid contact of tape with skin. [x] Change dressing: [] Daily    [] Every Other Day [] Three times per week   [x] Once a week [] Do Not Change Dressing   [x] Other:PRN    Compression:  Apply: [x] Multilayer Compression Wrap Applied in Clinic 3 layer []RightLeg [x]Left Leg   [x] Multi-layer compression. Do not get leg(s) with wrap wet. If wraps become too tight call the center or completely remove the wrap.       [x] Elevate leg(s) above the level of the heart when sitting. [x] Avoid prolonged standing in one place. Dietary:  [x] Diet as tolerated: [] Calorie Diabetic Diet: [] No Added Salt:  [x] Increase Protein: [] Other:   Activity:  [x] Activity as tolerated:  [] Patient has no activity restrictions     [] Strict Bedrest: [] Remain off Work:     [] May return to full duty work:                                   [] Return to work with restrictions:             Return Appointment:  [] Wound and dressing supply provider:   [] ECF or Home Healthcare:  [x] Wound Assessment: RN visit in 1 week and PRN [] Physician or NP scheduled for Wound Assessment:   [x] Return Appointment: With Dr. Quin Simmonds  in  [facility])  [] Ordered tests:      Electronically signed on 9/26/2019 at 11:49 AM     [facility] Information: Should you experience any significant changes in your wound(s) or have questions about your wound care, please contact the [facility] Main at [address] [place] [address] 8:00 am - 4:30. If you need help with your wound outside these hours and cannot wait until we are again available, contact your PCP or go to the hospital emergency room. PLEASE NOTE: IF YOU ARE UNABLE TO OBTAIN WOUND SUPPLIES, CONTINUE TO USE THE SUPPLIES YOU HAVE AVAILABLE UNTIL YOU ARE ABLE TO REACH US. IT IS MOST IMPORTANT TO KEEP THE WOUND COVERED AT ALL TIMES.       Physician Signature:_______________________    Date: ___________ Time:  ____________

## 2019-09-26 NOTE — WOUND CARE
09/26/19 1206 Wound Leg lower Anterior; Left Date First Assessed/Time First Assessed: 08/08/19 1413   Present on Hospital Admission: Yes  Primary Wound Type: Trauma  Location: Leg lower  Wound Location Orientation: Anterior; Left Dressing Type Applied Compression Wrap/Venous Stasis; Absorptive;Collagens/cell matrix (HFBR) Discharge Condition:STABLE Ambulatory Status:WALKING Discharge Destination:HOME Transportation: PRIVATE Accompanied by: SELF

## 2019-09-26 NOTE — PROGRESS NOTES
[address] [place] [address] PROGRESS NOTE    Name:  Nara Patel  MR#:  [id number]  :  1990  ACCOUNT #:  [de-identified]  DATE OF SERVICE:  2019      SUBJECTIVE:  The patient returns for treatment of a laceration without foreign body to the left leg, S81.812D, with a venous hypertension ulcer, I87.312, which was originally down to muscle, L97.923. I saw the patient two weeks ago. The wound was debrided down to subcutaneous tissue. We continued Ayla Ag, Hydrofera Blue and a three-layer wrap. The patient had a nursing visit 7 days ago. It was very comfortable when she had it applied, but 48 hours later, which was 5 days ago from today, it was too uncomfortable and she removed it and switched to her own 20-30 mmHg stocking. She has also been wearing that same strength stocking on the right leg and tolerating it quite well. There had been no changes noted in her medications, allergies or review of systems. OBJECTIVE:  Her temperature is 97.6, pulse 92, respirations 16, blood pressure 143/113. Last time, the patient's wound had gotten smaller and was 1.2 x 2.5 x 0.2 cm. Today, it is dramatically improved at 0.5 x 0.8 x 0.1 cm. It is quite clean and not in need of debridement. ASSESSMENT AND PLAN:  We are going to continue Ayla Ag, Hydrofera blue and a three-layer wrap along with leg elevation and calf muscle exercises. The patient will use the 20-30 mmHg stocking on the right leg. If she has to remove the stocking on the left leg, she will again use the 20-30 on that leg. The patient will return for nursing visit in 1 week. If she has healed at that point, we will use a nonadherent dressing and a three-layer wrap, and I will see her again in 2 weeks. If it has not healed next week, when I see in 2 weeks and if it is healed, we will then wrap it again for one more week. All questions were answered. Her condition on discharge is stable.         Fernandez Corcoran MD [id number]  D:  09/26/2019 12:01  T:  09/26/2019 13:20  JOB #:  [id number]  CC:

## 2019-10-03 ENCOUNTER — HOSPITAL ENCOUNTER (OUTPATIENT)
Dept: WOUND CARE | Age: 29
Discharge: HOME | End: 2019-10-03
Payer: MEDICAID

## 2019-10-03 VITALS
RESPIRATION RATE: 16 BRPM | SYSTOLIC BLOOD PRESSURE: 133 MMHG | HEART RATE: 89 BPM | TEMPERATURE: 98.3 F | DIASTOLIC BLOOD PRESSURE: 95 MMHG

## 2019-10-03 DIAGNOSIS — L97.923 NON-PRESSURE CHRONIC ULCER OF LEFT LOWER LEG WITH NECROSIS OF MUSCLE (HCC): ICD-10-CM

## 2019-10-03 DIAGNOSIS — S81.812D LACERATION OF LEFT LOWER EXTREMITY, SUBSEQUENT ENCOUNTER: ICD-10-CM

## 2019-10-03 DIAGNOSIS — I87.312 IDIOPATHIC CHRONIC VENOUS HYPERTENSION OF LEFT LOWER EXTREMITY WITH ULCER (HCC): ICD-10-CM

## 2019-10-03 DIAGNOSIS — L97.929 IDIOPATHIC CHRONIC VENOUS HYPERTENSION OF LEFT LOWER EXTREMITY WITH ULCER (HCC): ICD-10-CM

## 2019-10-03 PROCEDURE — 29581 APPL MULTLAYER CMPRN SYS LEG: CPT

## 2019-10-03 NOTE — PROGRESS NOTES
10/03/19 1105   Wound Leg lower Anterior; Left   Date First Assessed/Time First Assessed: 08/08/19 1413   Present on Hospital Admission: Yes  Primary Wound Type: Trauma  Location: Leg lower  Wound Location Orientation: Anterior; Left   Dressing Status Breakthrough drainage; Intact; Removed   Dressing Type Foam   Drainage Amount Scant   Drainage Color Serosanguinous   Wound Odor None   Kari-wound Assessment Intact   Dressing Changed Changed/New   Dressing Type Applied Compression Wrap/Venous Stasis;Collagens/cell matrix; Alginate   Procedure Tolerated Well     Visit Vitals  BP (!) 133/95   Pulse 89   Temp 98.3 °F (36.8 °C)   Resp 16     Discharge Condition:Stable   Ambulatory Status:Ambulatory  Discharge Destination:Home  Transportation: Private Car  Accompanied by: Self     Nurse Visit

## 2019-10-11 ENCOUNTER — HOSPITAL ENCOUNTER (OUTPATIENT)
Dept: WOUND CARE | Age: 29
Discharge: HOME | End: 2019-10-11
Payer: MEDICAID

## 2019-10-11 VITALS — DIASTOLIC BLOOD PRESSURE: 89 MMHG | SYSTOLIC BLOOD PRESSURE: 138 MMHG | HEART RATE: 112 BPM | TEMPERATURE: 98.5 F

## 2019-10-11 NOTE — WOUND CARE
10/11/19 1048 Wound Leg lower Anterior; Left Date First Assessed/Time First Assessed: 08/08/19 1413   Present on Hospital Admission: Yes  Primary Wound Type: Trauma  Location: Leg lower  Wound Location Orientation: Anterior; Left Dressing Status Removed Dressing Type Adhesive wound dressing (Band-Aid) Dressing Type Applied Compression Wrap/Venous Stasis Procedure Tolerated Well Visit Vitals /89 Pulse (!) 112 Temp 98.5 °F (36.9 °C) Nurse visit. Patient wound healed, discharged from clinic. Discharge Condition:Stable Ambulatory Status:Ambulatory Discharge Destination:Home Transportation: Outside ride service Accompanied by: Self

## 2021-12-02 ENCOUNTER — HOSPITAL ENCOUNTER (OUTPATIENT)
Dept: WOUND CARE | Age: 31
Discharge: HOME | End: 2021-12-02
Payer: MEDICAID

## 2021-12-02 VITALS — TEMPERATURE: 97.8 F | DIASTOLIC BLOOD PRESSURE: 113 MMHG | HEART RATE: 98 BPM | SYSTOLIC BLOOD PRESSURE: 150 MMHG

## 2021-12-02 PROCEDURE — 11042 DBRDMT SUBQ TIS 1ST 20SQCM/<: CPT

## 2021-12-02 PROCEDURE — 99212 OFFICE O/P EST SF 10 MIN: CPT

## 2021-12-02 PROCEDURE — 99202 OFFICE O/P NEW SF 15 MIN: CPT

## 2021-12-02 NOTE — WOUND CARE
12/02/21 0945   LLE Peripheral Vascular    Capillary Refill Less than/equal to 3 seconds   Color Appropriate for race   Temperature Warm   Sensation Present   Pedal Pulse Palpable     Visit Vitals  BP (!) 147/108 (BP 1 Location: Left upper arm, BP Patient Position: Lying; At rest)   Pulse 98   Temp 97.8 °F (36.6 °C)

## 2021-12-02 NOTE — WOUND CARE
[address]   Progress Note and Procedure Note     [id number] Angela Lemus RECORD NUMBER:  [id number]  AGE: 27 y.o. RACE BLACK/  GENDER: female  : 1990  EPISODE DATE:  2021    Subjective:   76-year-old female \"prediabetic\", last seen in this clinic in  for a left leg wound, presents now having sustained a traumatic wound to her left calf on . She has been doing wound care with some material she had around the house from her prior left leg wound, but presents now for formal wound care evaluation and recommendations. She does not check her blood sugar. Her blood pressure is markedly elevated today, and she is not addressing that problem. She also has lower extremity edema which she is not wearing compression stockings to treat. Chief Complaint   Patient presents with    Wound Check         HISTORY of PRESENT ILLNESS HPI    Collin Liu is a 27 y.o. female who presents today for wound/ulcer evaluation.    History of Wound Context: LLE  Wound/Ulcer Pain Timing/Severity: mild  Quality of pain: sharp  Severity:  1 / 10   Modifying Factors: None  Associated Signs/Symptoms: edema    Ulcer Identification:  Ulcer Type: traumatic    Contributing Factors: edema    Wound: LLE         PAST MEDICAL HISTORY    Past Medical History:   Diagnosis Date    Asthma     Hypertension         PAST SURGICAL HISTORY    Past Surgical History:   Procedure Laterality Date    HX GI      HX HEENT         FAMILY HISTORY    Family History   Problem Relation Age of Onset    Asthma Mother     No Known Problems Father     Diabetes Maternal Grandmother     Hypertension Maternal Grandmother        SOCIAL HISTORY    Social History     Tobacco Use    Smoking status: Never Smoker    Smokeless tobacco: Never Used   Substance Use Topics    Alcohol use: Yes     Comment: mixed drink occ    Drug use: No       ALLERGIES    No Known Allergies    MEDICATIONS    Current Outpatient Medications on File Prior to Encounter   Medication Sig Dispense Refill    spironolactone (ALDACTONE) 25 mg tablet Take 100 mg by mouth daily.  cyanocobalamin (VITAMIN B-12) 100 mcg tablet Take  by mouth daily.  sertraline HCl (ZOLOFT PO) Take 100 mg by mouth daily.  liraglutide (SAXENDA) 3 mg/0.5 mL (18 mg/3 mL) pen 0.6 mg by SubCUTAneous route daily.  HEADACHE RELIEF, ASA-ACET-CAF, 250-250-65 mg per tablet TAKE 1 TAB BY MOUTH EVERY EIGHT (8) HOURS AS NEEDED FOR PAIN. 50 Tab 2    levonorgestrel (MIRENA) 20 mcg/24 hr (5 years) IUD 1 Each by IntraUTERine route once.  albuterol (PROVENTIL HFA, VENTOLIN HFA, PROAIR HFA) 90 mcg/actuation inhaler Take 2 Puffs by inhalation every four (4) hours as needed for Wheezing. No current facility-administered medications on file prior to encounter. REVIEW OF SYSTEMS    Pertinent items are noted in HPI. Objective:     Visit Vitals  BP (!) 147/108 (BP 1 Location: Left upper arm, BP Patient Position: Lying; At rest)   Pulse 98   Temp 97.8 °F (36.6 °C)       Wt Readings from Last 3 Encounters:   07/16/19 120.2 kg (265 lb)   11/01/18 117 kg (257 lb 15 oz)   11/09/16 96.2 kg (212 lb)       PHYSICAL EXAM  On the left lateral calf, there is well demarcated wound with exposed subcutaneous tissue. [place] Glow is debrided. There is no evidence of active infection. Pertinent items are noted in HPI. Assessment:   Traumatic left leg wound of approximately 1 months duration  Problem List Items Addressed This Visit     None          POP IN PROCEDURE TYPE (DEBRIDEMENT, BIOPSY, I&D, SKIN SUB, CHEMICAL CAUERTY)     Plan:   Start treatment with Hydrofera Blue classic, double Tubigrip compression  Leg elevation  Diabetes management  Hypertension management    Treatment Note please see attached Discharge Instructions    Written patient dismissal instructions given to patient or POA.          Electronically signed by Doroteo Nesbitt MD on 12/2/2021 at 10:01 AM              Debridement Wound Care        Problem List Items Addressed This Visit     None          Procedure Note  Indications:  Based on my examination of this patient's wound(s)/ulcer(s) today, debridement is required to promote healing and evaluate the wound base. Performed by: Amy Kingsley MD    Consent obtained: Yes    Time out taken: Yes    Debridement: Excisional    Using curette the wound(s)/ulcer(s) was/were sharply debrided down through and including the removal of    subcutaneous tissue    Devitalized Tissue Debrided: slough and necrotic/eschar    Pre Debridement Measurements:  Are located in the [place]  Documentation Flow Sheet    Non-Pressure ulcer, fat layer exposed    Wound/Ulcer #: 1    Post Debridement Measurements:  Wound/Ulcer Descriptions are Pre Debridement except measurements:    Wound Leg lower Left; Lateral #1 12/02/21 (Active)   Wound Image   12/02/21 0935   Wound Etiology Traumatic 12/02/21 0935   Dressing Status Breakthrough drainage noted;  Intact 12/02/21 0935   Cleansed Cleansed with saline 12/02/21 0935   Wound Length (cm) 2.6 cm 12/02/21 0935   Wound Width (cm) 3.2 cm 12/02/21 0935   Wound Depth (cm) 0.5 cm 12/02/21 0935   Wound Surface Area (cm^2) 8.32 cm^2 12/02/21 0935   Wound Volume (cm^3) 4.16 cm^3 12/02/21 0935   Wound Assessment Slough; [place]/red 12/02/21 0935   Drainage Amount Moderate 12/02/21 0935   Drainage Description Serosanguinous 12/02/21 0935   Wound Odor None 12/02/21 0935   Kari-Wound/Incision Assessment Blanchable erythema 12/02/21 0935   Edges Flat/open edges 12/02/21 0935   Wound Thickness Description Full thickness 12/02/21 0935   Number of days: 0        Total Surface Area Debrided:  8 sq cm     Estimated Blood Loss:  Minimal     Hemostasis Achieved: Pressure    Procedural Pain: 2 / 10     Post Procedural Pain: 1 / 10     Response to treatment: Well tolerated by patient

## 2021-12-02 NOTE — DISCHARGE INSTRUCTIONS
Discharge Instructions/Wound Orders  [place] [address] [facility], [facility] [address]  Telephone: [phone]    NAME:  Juan Carlos Jeffery OF BIRTH:  1990  MEDICAL RECORD NUMBER:  [id number]  DATE:  12/2/2021  Wound Care Orders:  Left Lower Leg: Cleanse with soap and water. Apply Hydrofera blue classic to wound bed, cover with gauze and secure with tape. Wear double Tubiegrip size F    Dietary:  [] Diet as tolerated: [x] Calorie Diabetic Diet:Low carb and no Sugar [] No Added Salt:[] Increase Protein: [] Other:Limit the amount of liquid you are drinking and avoid drinking in between meals   Activity:  [x] Activity as tolerated:  [] Patient has no activity restrictions     [] Strict Bedrest: [] Remain off Work:     [] May return to full duty work:                                   [] Return to work with restrictions:             Return Appointment:  [x] Return Appointment: With Dr. Kelvin Marin  in  [address] [place] [address])  [] Ordered tests:    Electronically signed Ihsan Quinonez RN on 12/2/2021 at 10:00 AM     [address]: Should you experience any significant changes in your wound(s) or have questions about your wound care, please contact the [address] [place] [address] at [address] [place] [address] 8:00 am - 4:30. If you need help with your wound outside these hours and cannot wait until we are again available, contact your PCP or go to the hospital emergency room. PLEASE NOTE: IF YOU ARE UNABLE TO OBTAIN WOUND SUPPLIES, CONTINUE TO USE THE SUPPLIES YOU HAVE AVAILABLE UNTIL YOU ARE ABLE TO REACH US. IT IS MOST IMPORTANT TO KEEP THE WOUND COVERED AT ALL TIMES.      Physician Signature:_______________________    Date: ___________ Time:  ____________

## 2021-12-09 ENCOUNTER — HOSPITAL ENCOUNTER (OUTPATIENT)
Dept: WOUND CARE | Age: 31
Discharge: HOME | End: 2021-12-09
Payer: MEDICAID

## 2021-12-09 VITALS
TEMPERATURE: 97.5 F | RESPIRATION RATE: 17 BRPM | DIASTOLIC BLOOD PRESSURE: 86 MMHG | SYSTOLIC BLOOD PRESSURE: 149 MMHG | HEART RATE: 97 BPM

## 2021-12-09 PROCEDURE — 99212 OFFICE O/P EST SF 10 MIN: CPT

## 2021-12-09 NOTE — WOUND CARE
[address]   Progress Note and Procedure Note   NO Procedure      Raghav Navas  MEDICAL RECORD NUMBER:  [id number]  AGE: 27 y.o. RACE BLACK/  GENDER: female  : 1990  EPISODE DATE:  2021    Subjective:   Reports less pain, less drainage, no fever  Chief Complaint   Patient presents with    Follow-up         HISTORY of PRESENT ILLNESS HPI    Raghav Navas is a 27 y.o. female who presents today for wound/ulcer evaluation. History of Wound Context: LLE  Wound/Ulcer Pain Timing/Severity: mild  Quality of pain: dull  Severity:  1 / 10   Modifying Factors: None  Associated Signs/Symptoms: none    Ulcer Identification:  Ulcer Type: venous and traumatic    Contributing Factors: edema    Wound: LLE         PAST MEDICAL HISTORY    Past Medical History:   Diagnosis Date    Asthma     Hypertension         PAST SURGICAL HISTORY    Past Surgical History:   Procedure Laterality Date    HX GI      HX HEENT         FAMILY HISTORY    Family History   Problem Relation Age of Onset    Asthma Mother     No Known Problems Father     Diabetes Maternal Grandmother     Hypertension Maternal Grandmother        SOCIAL HISTORY    Social History     Tobacco Use    Smoking status: Never Smoker    Smokeless tobacco: Never Used   Substance Use Topics    Alcohol use: Yes     Comment: mixed drink occ    Drug use: No       ALLERGIES    No Known Allergies    MEDICATIONS    Current Outpatient Medications on File Prior to Encounter   Medication Sig Dispense Refill    spironolactone (ALDACTONE) 25 mg tablet Take 100 mg by mouth daily.  cyanocobalamin (VITAMIN B-12) 100 mcg tablet Take  by mouth daily.  sertraline HCl (ZOLOFT PO) Take 100 mg by mouth daily.  liraglutide (SAXENDA) 3 mg/0.5 mL (18 mg/3 mL) pen 0.6 mg by SubCUTAneous route daily.  HEADACHE RELIEF, ASA-ACET-CAF, 250-250-65 mg per tablet TAKE 1 TAB BY MOUTH EVERY EIGHT (8) HOURS AS NEEDED FOR PAIN.  48 Tab 2    levonorgestrel (MIRENA) 20 mcg/24 hr (5 years) IUD 1 Each by IntraUTERine route once.  albuterol (PROVENTIL HFA, VENTOLIN HFA, PROAIR HFA) 90 mcg/actuation inhaler Take 2 Puffs by inhalation every four (4) hours as needed for Wheezing. No current facility-administered medications on file prior to encounter. REVIEW OF SYSTEMS    Pertinent items are noted in HPI. Objective:     Visit Vitals  BP (!) 149/86 (BP 1 Location: Left upper arm, BP Patient Position: Sitting)   Pulse 97   Temp 97.5 °F (36.4 °C)   Resp 17       Wt Readings from Last 3 Encounters:   07/16/19 120.2 kg (265 lb)   11/01/18 117 kg (257 lb 15 oz)   11/09/16 96.2 kg (212 lb)       PHYSICAL EXAM  Left lateral leg wound is healthy appearance, granulating, no evidence of infection  Pertinent items are noted in HPI. Assessment:   Good progress  Problem List Items Addressed This Visit     None          Procedure Note  Indications:  Based on my examination of this patient's wound(s)/ulcer(s) today, debridement is not required to promote healing and evaluate the wound base. Wound Leg lower Left; Lateral #1 12/02/21 (Active)   Wound Image   12/09/21 1334   Wound Etiology Traumatic 12/09/21 1334   Dressing Status Intact; New drainage noted; Old drainage noted 12/09/21 1334   Cleansed Cleansed with saline 12/09/21 1334   Dressing/Treatment Hydrofera Blue; ABD pad; Roll gauze;  Tubular bandage 12/02/21 1016   Wound Length (cm) 2.8 cm 12/09/21 1334   Wound Width (cm) 0.25 cm 12/09/21 1334   Wound Depth (cm) 0.2 cm 12/09/21 1334   Wound Surface Area (cm^2) 0.7 cm^2 12/09/21 1334   Change in Wound Size % 91.59 12/09/21 1334   Wound Volume (cm^3) 0.14 cm^3 12/09/21 1334   Wound Healing % 97 12/09/21 1334   Wound Assessment [place]/red; [facility] 12/09/21 1334   Drainage Amount Moderate 12/09/21 1334   Drainage Description Serosanguinous 12/09/21 1334   Wound Odor None 12/09/21 1334   Kari-Wound/Incision Assessment Blanchable erythema 12/09/21 1334   Edges Flat/open edges 12/09/21 1334   Wound Thickness Description Full thickness 12/09/21 1334   Number of days: 7        Plan:   And Ayla to Hydrofera Blue, continue Tubigrip compression, leg elevation    Treatment Note please see attached Discharge Instructions    Written patient dismissal instructions given to patient or POA.          Electronically signed by Alba Murry MD on 12/9/2021 at 2:10 PM

## 2021-12-09 NOTE — WOUND CARE
12/09/21 1334   Left Leg Edema Point of Measurement   Leg circumference 42 cm   Ankle circumference 25.5 cm   LLE Peripheral Vascular    Capillary Refill Less than/equal to 3 seconds   Color Appropriate for race   Temperature Warm   Sensation Present   Pedal Pulse Palpable   Wound Leg lower Left; Lateral #1 12/02/21   Date First Assessed/Time First Assessed: 12/02/21 0940   Present on Hospital Admission: Yes  Wound Approximate Age at First Assessment (Weeks): 4 weeks  Primary Wound Type: Trauma  Location: Leg lower  Wound Location Orientation: Left; Lateral  Wound . .. Wound Image    Wound Etiology Traumatic   Dressing Status Intact; New drainage noted;  Old drainage noted   Cleansed Cleansed with saline   Wound Length (cm) 2.8 cm   Wound Width (cm) 0.25 cm   Wound Depth (cm) 0.2 cm   Wound Surface Area (cm^2) 0.7 cm^2   Change in Wound Size % 91.59   Wound Volume (cm^3) 0.14 cm^3   Wound Healing % 97   Wound Assessment Pink/red; Slough   Drainage Amount Moderate   Drainage Description Serosanguinous   Wound Odor None   Kari-Wound/Incision Assessment Blanchable erythema   Edges Flat/open edges   Wound Thickness Description Full thickness   Pain 1   Pain Scale 1 Numeric (0 - 10)   Pain Intensity 1 0     Visit Vitals  BP (!) 149/86 (BP 1 Location: Left upper arm, BP Patient Position: Sitting)   Pulse 97   Temp 97.5 °F (36.4 °C)   Resp 17

## 2021-12-09 NOTE — DISCHARGE INSTRUCTIONS
Discharge Instructions/Wound Orders  [place] [address] [facility], [facility] [address]  Telephone: [phone]    NAME:  John Bishop OF BIRTH:  1990  MEDICAL RECORD NUMBER:  [id number]  DATE:  12/9/2021  Wound Care Orders:  Left Lower Leg: Cleanse with soap and water. Apply Ayla to wound bed, cover with Hydrofera blue classic  secure with gauze and secure with tape. Wear double Tubiegrip size F    Dietary:  [x] Diet as tolerated: [] Calorie Diabetic Diet:Low carb and no Sugar [] No Added Salt:[] Increase Protein: [] Other:Limit the amount of liquid you are drinking and avoid drinking in between meals   Activity:  [x] Activity as tolerated:  [] Patient has no activity restrictions     [] Strict Bedrest: [] Remain off Work:     [] May return to full duty work:                                   [] Return to work with restrictions:             Return Appointment:  [x] Return Appointment: With [facility]  in  [facility])  [] Ordered tests:    Electronically signed Sakina Patton RN on 12/9/2021 at 2:12 PM     [address]: Should you experience any significant changes in your wound(s) or have questions about your wound care, please contact the [address] [place] [address] at [address] [place] [address] 8:00 am - 4:30. If you need help with your wound outside these hours and cannot wait until we are again available, contact your PCP or go to the hospital emergency room. PLEASE NOTE: IF YOU ARE UNABLE TO OBTAIN WOUND SUPPLIES, CONTINUE TO USE THE SUPPLIES YOU HAVE AVAILABLE UNTIL YOU ARE ABLE TO REACH US. IT IS MOST IMPORTANT TO KEEP THE WOUND COVERED AT ALL TIMES.      Physician Signature:_______________________    Date: ___________ Time:  ____________

## 2021-12-23 ENCOUNTER — HOSPITAL ENCOUNTER (OUTPATIENT)
Dept: WOUND CARE | Age: 31
Discharge: HOME | End: 2021-12-23
Payer: MEDICAID

## 2021-12-23 VITALS
SYSTOLIC BLOOD PRESSURE: 124 MMHG | RESPIRATION RATE: 18 BRPM | HEART RATE: 101 BPM | TEMPERATURE: 97.3 F | DIASTOLIC BLOOD PRESSURE: 88 MMHG

## 2021-12-23 PROCEDURE — 99212 OFFICE O/P EST SF 10 MIN: CPT

## 2021-12-23 NOTE — DISCHARGE INSTRUCTIONS
Discharge Instructions/Wound Orders  [place] [address] [facility], [facility] [address]  Telephone: [phone]    NAME:  Nae Rasmussen OF BIRTH:  1990  MEDICAL RECORD NUMBER:  [id number]  DATE:  12/23/2021  Wound Care Orders:  Left Lower Leg: Cleanse with soap and water. Apply Ayla to wound bed, cover with Hydrofera blue classic  secure with gauze and secure with tape. Wear double Tubiegrip size F    Dietary:  [] Diet as tolerated: [] Calorie Diabetic Diet:Low carb and no Sugar [] No Added Salt:[x] Increase Protein: [] Other:Limit the amount of liquid you are drinking and avoid drinking in between meals   Activity:  [x] Activity as tolerated:  [] Patient has no activity restrictions     [] Strict Bedrest: [] Remain off Work:     [] May return to full duty work:                                   [] Return to work with restrictions:             Return Appointment:  [x] Return Appointment: With Dr. Renata Ovalles  in  [facility])  [] Ordered tests:    Electronically signed Umu Casey RN on 12/23/2021 at 2:01 PM     Lorean Vergara 281: Should you experience any significant changes in your wound(s) or have questions about your wound care, please contact the [address] [place] [address] at [address] [place] [address] 8:00 am - 4:30. If you need help with your wound outside these hours and cannot wait until we are again available, contact your PCP or go to the hospital emergency room. PLEASE NOTE: IF YOU ARE UNABLE TO OBTAIN WOUND SUPPLIES, CONTINUE TO USE THE SUPPLIES YOU HAVE AVAILABLE UNTIL YOU ARE ABLE TO REACH US. IT IS MOST IMPORTANT TO KEEP THE WOUND COVERED AT ALL TIMES.      Physician Signature:_______________________    Date: ___________ Time:  ____________

## 2021-12-23 NOTE — WOUND CARE
12/23/21 1344   Left Leg Edema Point of Measurement   Leg circumference 44 cm   Ankle circumference 26 cm   Compression Therapy Tubular elastic support bandage   LLE Peripheral Vascular    Capillary Refill Less than/equal to 3 seconds   Color Appropriate for race   Temperature Warm   Sensation Present   Pedal Pulse Palpable   Wound Leg lower Left; Lateral #1 12/02/21   Date First Assessed/Time First Assessed: 12/02/21 0940   Present on Hospital Admission: Yes  Wound Approximate Age at First Assessment (Weeks): 4 weeks  Primary Wound Type: Trauma  Location: Leg lower  Wound Location Orientation: Left; Lateral  Wound . .. Wound Image    Wound Etiology Traumatic   Dressing Status Intact; Old drainage noted   Cleansed Cleansed with saline   Wound Length (cm) 2 cm   Wound Width (cm) 2 cm   Wound Depth (cm) 0.1 cm   Wound Surface Area (cm^2) 4 cm^2   Change in Wound Size % 51.92   Wound Volume (cm^3) 0.4 cm^3   Wound Healing % 90   Wound Assessment Bleeding;Pink/red   Drainage Amount Moderate   Drainage Description Serosanguinous   Wound Odor None   Kari-Wound/Incision Assessment Blanchable erythema   Edges Flat/open edges   Wound Thickness Description Full thickness   Pain 1   Pain Scale 1 Numeric (0 - 10)   Pain Intensity 1 0     Visit Vitals  Temp 97.3 °F (36.3 °C)   Resp 18

## 2021-12-23 NOTE — WOUND CARE
12/23/21 1344   Left Leg Edema Point of Measurement   Leg circumference 44 cm   Ankle circumference 26 cm   Compression Therapy Tubular elastic support bandage   LLE Peripheral Vascular    Capillary Refill Less than/equal to 3 seconds   Color Appropriate for race   Temperature Warm   Sensation Present   Pedal Pulse Palpable   Wound Leg lower Left; Lateral #1 12/02/21   Date First Assessed/Time First Assessed: 12/02/21 0940   Present on Hospital Admission: Yes  Wound Approximate Age at First Assessment (Weeks): 4 weeks  Primary Wound Type: Trauma  Location: Leg lower  Wound Location Orientation: Left; Lateral  Wound . .. Wound Image    Wound Etiology Traumatic   Dressing Status Intact; Old drainage noted   Cleansed Cleansed with saline   Wound Length (cm) 2 cm   Wound Width (cm) 2 cm   Wound Depth (cm) 0.1 cm   Wound Surface Area (cm^2) 4 cm^2   Change in Wound Size % 51.92   Wound Volume (cm^3) 0.4 cm^3   Wound Healing % 90   Wound Assessment Bleeding;Pink/red   Drainage Amount Moderate   Drainage Description Serosanguinous   Wound Odor None   Kari-Wound/Incision Assessment Blanchable erythema   Edges Flat/open edges   Wound Thickness Description Full thickness   Pain 1   Pain Scale 1 Numeric (0 - 10)   Pain Intensity 1 0     Visit Vitals  /88 (BP 1 Location: Left upper arm, BP Patient Position: At rest;Sitting)   Pulse (!) 101   Temp 97.3 °F (36.3 °C)   Resp 18

## 2021-12-23 NOTE — WOUND CARE
[address]   Progress Note and Procedure Note   NO Procedure      Inez Magana  MEDICAL RECORD NUMBER:  [id number]  AGE: 27 y.o. RACE BLACK/  GENDER: female  : 1990  EPISODE DATE:  2021    Subjective:   Patient reports no significant pain, no drainage  Chief Complaint   Patient presents with    Follow-up         HISTORY of PRESENT ILLNESS HPI    Inez Magana is a 27 y.o. female who presents today for wound/ulcer evaluation. History of Wound Context: LLE  Wound/Ulcer Pain Timing/Severity: mild  Quality of pain: aching  Severity:  1  10   Modifying Factors: None  Associated Signs/Symptoms: edema    Ulcer Identification:  Ulcer Type: venous    Contributing Factors: edema    Wound: LLE         PAST MEDICAL HISTORY    Past Medical History:   Diagnosis Date    Asthma     Hypertension         PAST SURGICAL HISTORY    Past Surgical History:   Procedure Laterality Date    HX GI      HX HEENT         FAMILY HISTORY    Family History   Problem Relation Age of Onset    Asthma Mother     No Known Problems Father     Diabetes Maternal Grandmother     Hypertension Maternal Grandmother        SOCIAL HISTORY    Social History     Tobacco Use    Smoking status: Never Smoker    Smokeless tobacco: Never Used   Substance Use Topics    Alcohol use: Yes     Comment: mixed drink occ    Drug use: No       ALLERGIES    No Known Allergies    MEDICATIONS    Current Outpatient Medications on File Prior to Encounter   Medication Sig Dispense Refill    spironolactone (ALDACTONE) 25 mg tablet Take 100 mg by mouth daily.  cyanocobalamin (VITAMIN B-12) 100 mcg tablet Take  by mouth daily.  sertraline HCl (ZOLOFT PO) Take 100 mg by mouth daily.  liraglutide (SAXENDA) 3 mg/0.5 mL (18 mg/3 mL) pen 0.6 mg by SubCUTAneous route daily.       HEADACHE RELIEF, ASA-ACET-CAF, 250-250-65 mg per tablet TAKE 1 TAB BY MOUTH EVERY EIGHT (8) HOURS AS NEEDED FOR PAIN. 50 Tab 2    albuterol (PROVENTIL HFA, VENTOLIN HFA, PROAIR HFA) 90 mcg/actuation inhaler Take 2 Puffs by inhalation every four (4) hours as needed for Wheezing.  levonorgestrel (MIRENA) 20 mcg/24 hr (5 years) IUD 1 Each by IntraUTERine route once. No current facility-administered medications on file prior to encounter. REVIEW OF SYSTEMS    Pertinent items are noted in HPI. Objective:     Visit Vitals  /88 (BP 1 Location: Left upper arm, BP Patient Position: At rest;Sitting)   Pulse (!) 101   Temp 97.3 °F (36.3 °C)   Resp 18       Wt Readings from Last 3 Encounters:   07/16/19 120.2 kg (265 lb)   11/01/18 117 kg (257 lb 15 oz)   11/09/16 96.2 kg (212 lb)       PHYSICAL EXAM  Wound measures smaller, healthy granulation, no evidence of infection    Pertinent items are noted in HPI. Assessment:   excellent progress    Problem List Items Addressed This Visit     None          Procedure Note  Indications:  Based on my examination of this patient's wound(s)/ulcer(s) today, debridement is not required to promote healing and evaluate the wound base. Wound Leg lower Left; Lateral #1 12/02/21 (Active)   Wound Image   12/23/21 1344   Wound Etiology Traumatic 12/23/21 1344   Dressing Status Intact; Old drainage noted 12/23/21 1344   Cleansed Cleansed with saline 12/23/21 1344   Dressing/Treatment Hydrofera Blue;ABD pad;Roll gauze; Tubular bandage 12/02/21 1016   Wound Length (cm) 2 cm 12/23/21 1344   Wound Width (cm) 2 cm 12/23/21 1344   Wound Depth (cm) 0.1 cm 12/23/21 1344   Wound Surface Area (cm^2) 4 cm^2 12/23/21 1344   Change in Wound Size % 51.92 12/23/21 1344   Wound Volume (cm^3) 0.4 cm^3 12/23/21 1344   Wound Healing % 90 12/23/21 1344   Wound Assessment Bleeding;Pink/red 12/23/21 1344   Drainage Amount Moderate 12/23/21 1344   Drainage Description Serosanguinous 12/23/21 1344   Wound Odor None 12/23/21 1344   Kari-Wound/Incision Assessment Blanchable erythema 12/23/21 1344   Edges Flat/open edges 12/23/21 1344   Wound Thickness Description Full thickness 12/23/21 1344   Number of days: 21        Plan:   Continue collagen, hydrofera blue, tubi     Treatment Note please see attached Discharge Instructions    Written patient dismissal instructions given to patient or POA.          Electronically signed by Chai Davila MD on 12/23/2021 at 2:03 PM

## 2022-01-06 ENCOUNTER — HOSPITAL ENCOUNTER (OUTPATIENT)
Dept: WOUND CARE | Age: 32
Discharge: HOME | End: 2022-01-06
Payer: MEDICAID

## 2022-01-06 VITALS
SYSTOLIC BLOOD PRESSURE: 138 MMHG | TEMPERATURE: 97.7 F | HEART RATE: 112 BPM | RESPIRATION RATE: 18 BRPM | DIASTOLIC BLOOD PRESSURE: 87 MMHG

## 2022-01-06 PROCEDURE — 11042 DBRDMT SUBQ TIS 1ST 20SQCM/<: CPT

## 2022-01-06 NOTE — WOUND CARE
[address]   Progress Note and Procedure Note     [id number] RECORD NUMBER:  [id number]  AGE: 27 y.o. RACE BLACK/  GENDER: female  : 1990  EPISODE DATE:  2022    Subjective:   C/o mild pain in LLE wound area, no drainage    Chief Complaint   Patient presents with    Follow-up         HISTORY of PRESENT ILLNESS HPI    Evelina Finn is a 27 y.o. female who presents today for wound/ulcer evaluation. History of Wound Context: LLE  Wound/Ulcer Pain Timing/Severity: mild  Quality of pain: aching  Severity:  1 / 10   Modifying Factors: None  Associated Signs/Symptoms: none    Ulcer Identification:  Ulcer Type: venous    Contributing Factors: edema    Wound: LLE         PAST MEDICAL HISTORY    Past Medical History:   Diagnosis Date    Asthma     Hypertension         PAST SURGICAL HISTORY    Past Surgical History:   Procedure Laterality Date    HX GI      HX HEENT         FAMILY HISTORY    Family History   Problem Relation Age of Onset    Asthma Mother     No Known Problems Father     Diabetes Maternal Grandmother     Hypertension Maternal Grandmother        SOCIAL HISTORY    Social History     Tobacco Use    Smoking status: Never Smoker    Smokeless tobacco: Never Used   Substance Use Topics    Alcohol use: Yes     Comment: mixed drink occ    Drug use: No       ALLERGIES    No Known Allergies    MEDICATIONS    Current Outpatient Medications on File Prior to Encounter   Medication Sig Dispense Refill    spironolactone (ALDACTONE) 25 mg tablet Take 100 mg by mouth daily.  cyanocobalamin (VITAMIN B-12) 100 mcg tablet Take  by mouth daily.  sertraline HCl (ZOLOFT PO) Take 100 mg by mouth daily.  liraglutide (SAXENDA) 3 mg/0.5 mL (18 mg/3 mL) pen 0.6 mg by SubCUTAneous route daily.       HEADACHE RELIEF, ASA-ACET-CAF, 250-250-65 mg per tablet TAKE 1 TAB BY MOUTH EVERY EIGHT (8) HOURS AS NEEDED FOR PAIN. 50 Tab 2    levonorgestrel (MIRENA) 20 mcg/24 hr (5 years) IUD 1 Each by IntraUTERine route once.  albuterol (PROVENTIL HFA, VENTOLIN HFA, PROAIR HFA) 90 mcg/actuation inhaler Take 2 Puffs by inhalation every four (4) hours as needed for Wheezing. No current facility-administered medications on file prior to encounter. REVIEW OF SYSTEMS    Pertinent items are noted in HPI. Objective:     Visit Vitals  /87 (BP 1 Location: Left upper arm, BP Patient Position: Sitting)   Pulse (!) 112   Temp 97.7 °F (36.5 °C)   Resp 18       Wt Readings from Last 3 Encounters:   07/16/19 120.2 kg (265 lb)   11/01/18 117 kg (257 lb 15 oz)   11/09/16 96.2 kg (212 lb)       PHYSICAL EXAM  LLE wound area decreased from 4 cm2 to 2.85 cm2, no infection    Pertinent items are noted in HPI. Assessment:    good progress    Problem List Items Addressed This Visit     None          POP IN PROCEDURE TYPE (DEBRIDEMENT, BIOPSY, I&D, SKIN SUB, CHEMICAL CAUERTY)     Plan:   Continue leslie, stop hydrofera blue  Venous reflux study    Treatment Note please see attached Discharge Instructions    Written patient dismissal instructions given to patient or POA. Electronically signed by Aniyah Escobar MD on 1/6/2022 at 2:41 PM              Debridement Wound Care        Problem List Items Addressed This Visit     None          Procedure Note  Indications:  Based on my examination of this patient's wound(s)/ulcer(s) today, debridement is required to promote healing and evaluate the wound base.     Performed by: Aniyah Escobar MD    Consent obtained: Yes    Time out taken: Yes    Debridement: Excisional    Using curette the wound(s)/ulcer(s) was/were sharply debrided down through and including the removal of    subcutaneous tissue    Devitalized Tissue Debrided: slough    Pre Debridement Measurements:  Are located in the Wound/Ulcer Documentation Flow Sheet    Non-Pressure ulcer, fat layer exposed    Wound/Ulcer #: 1    Post Debridement Measurements:  Wound/Ulcer Descriptions are Pre Debridement except measurements:    Wound Leg lower Left; Lateral #1 12/02/21 (Active)   Wound Image   01/06/22 1416   Wound Etiology Traumatic 01/06/22 1416   Dressing Status Intact; Old drainage noted 01/06/22 1416   Cleansed Cleansed with saline 12/23/21 1344   Dressing/Treatment Collagen with Ag; Foam impregnated with Ag;Gauze dressing/dressing sponge 12/23/21 1413   Wound Length (cm) 1.5 cm 01/06/22 1416   Wound Width (cm) 1.9 cm 01/06/22 1416   Wound Depth (cm) 0.1 cm 01/06/22 1416   Wound Surface Area (cm^2) 2.85 cm^2 01/06/22 1416   Change in Wound Size % 65.75 01/06/22 1416   Wound Volume (cm^3) 0.285 cm^3 01/06/22 1416   Wound Healing % 93 01/06/22 1416   Wound Assessment Hyper granulation tissue;Pink/red;Slough 01/06/22 1416   Drainage Amount Moderate 01/06/22 1416   Drainage Description Serosanguinous 01/06/22 1416   Wound Odor None 01/06/22 1416   Kari-Wound/Incision Assessment Intact 01/06/22 1416   Edges Flat/open edges 01/06/22 1416   Wound Thickness Description Full thickness 01/06/22 1416   Number of days: 35        Total Surface Area Debrided:  2.85 sq cm     Estimated Blood Loss:  Minimal     Hemostasis Achieved: Pressure    Procedural Pain: 1 / 10     Post Procedural Pain: 0 / 10     Response to treatment: Well tolerated by patient

## 2022-01-06 NOTE — WOUND CARE
01/06/22 1416   Left Leg Edema Point of Measurement   Leg circumference 44 cm   Ankle circumference 26 cm   LLE Peripheral Vascular    Capillary Refill Less than/equal to 3 seconds   Color Appropriate for race   Temperature Warm   Sensation Present   Pedal Pulse Palpable   Wound Leg lower Left; Lateral #1 12/02/21   Date First Assessed/Time First Assessed: 12/02/21 0940   Present on Hospital Admission: Yes  Wound Approximate Age at First Assessment (Weeks): 4 weeks  Primary Wound Type: Trauma  Location: Leg lower  Wound Location Orientation: Left; Lateral  Wound . .. Wound Image    Wound Etiology Traumatic   Dressing Status Intact; Old drainage noted  (removed collagen, hydrofera blue, roll gauze)   Wound Length (cm) 1.5 cm   Wound Width (cm) 1.9 cm   Wound Depth (cm) 0.1 cm   Wound Surface Area (cm^2) 2.85 cm^2   Change in Wound Size % 65.75   Wound Volume (cm^3) 0.285 cm^3   Wound Healing % 93   Wound Assessment Hyper granulation tissue;Pink/red;Slough   Drainage Amount Moderate   Drainage Description Serosanguinous   Wound Odor None   Kari-Wound/Incision Assessment Intact   Edges Flat/open edges   Wound Thickness Description Full thickness   Pain 1   Pain Scale 1 Numeric (0 - 10)   Pain Intensity 1 0     Visit Vitals  /87 (BP 1 Location: Left upper arm, BP Patient Position: Sitting)   Pulse (!) 112   Temp 97.7 °F (36.5 °C)   Resp 18

## 2022-01-06 NOTE — DISCHARGE INSTRUCTIONS
Discharge Instructions/Wound Orders  [place] [address] [facility], [address]  Telephone: [phone]    NAME:  Darryl Reaves OF BIRTH:  1990  MEDICAL RECORD NUMBER:  [id number]  DATE:  1/6/2022  Wound Care Orders:  Left Lower Leg: Cleanse with soap and water. Apply Ayla to wound bed, secure with gauze and secure with tape. Wear double Tubiegrip size F. Obtain vein studies. Dietary:  [x] Diet as tolerated: [] Calorie Diabetic Diet:Low carb and no Sugar [] No Added Salt:[] Increase Protein: [] Other:Limit the amount of liquid you are drinking and avoid drinking in between meals   Activity:  [x] Activity as tolerated:  [] Patient has no activity restrictions     [] Strict Bedrest: [] Remain off Work:     [] May return to full duty work:                                   [] Return to work with restrictions:             Return Appointment:  [x] Return Appointment: With Clinic MD  in  [facility] ''R'' )  [] Ordered tests:    Electronically signed Starr Barker RN on 1/6/2022 at 2:37 PM     Lorena Vergara 281: Should you experience any significant changes in your wound(s) or have questions about your wound care, please contact the [address] [place] [address] at [address] [place] [address] 8:00 am - 4:30. If you need help with your wound outside these hours and cannot wait until we are again available, contact your PCP or go to the hospital emergency room. PLEASE NOTE: IF YOU ARE UNABLE TO OBTAIN WOUND SUPPLIES, CONTINUE TO USE THE SUPPLIES YOU HAVE AVAILABLE UNTIL YOU ARE ABLE TO REACH US. IT IS MOST IMPORTANT TO KEEP THE WOUND COVERED AT ALL TIMES.      Physician Signature:_______________________    Date: ___________ Time:  ____________

## 2022-01-19 ENCOUNTER — HOSPITAL ENCOUNTER (OUTPATIENT)
Dept: WOUND CARE | Age: 32
Discharge: HOME | End: 2022-01-19
Payer: MEDICAID

## 2022-01-19 VITALS — DIASTOLIC BLOOD PRESSURE: 100 MMHG | TEMPERATURE: 96.3 F | HEART RATE: 97 BPM | SYSTOLIC BLOOD PRESSURE: 149 MMHG

## 2022-01-19 PROCEDURE — 99212 OFFICE O/P EST SF 10 MIN: CPT

## 2022-01-19 PROCEDURE — 11042 DBRDMT SUBQ TIS 1ST 20SQCM/<: CPT

## 2022-01-19 NOTE — DISCHARGE INSTRUCTIONS
Discharge Instructions/Wound Orders  [place] [address] 1200 [facility], [address]  Telephone: [phone]    NAME:  Naveed Bush OF BIRTH:  1990  MEDICAL RECORD NUMBER:  [id number]  DATE:  1/19/2022  Wound Care Orders:   Left Lower Leg: Cleanse with soap and water. Apply betadine to wound bed, secure with gauze and secure with tape daily or bandaid, Wear double Tubiegrip size F. Obtain vein studies. Dietary:  [] Diet as tolerated: [] Calorie Diabetic Diet:Low carb and no Sugar [] No Added Salt:[x] Increase Protein: [] Other:Limit the amount of liquid you are drinking and avoid drinking in between meals   Activity:  [x] Activity as tolerated:  [] Patient has no activity restrictions     [] Strict Bedrest: [] Remain off Work:     [] May return to full duty work:                                   [] Return to work with restrictions:             Return Appointment:  [] Return Appointment: With [facility]  in  [facility])  [] Ordered tests:    Electronically signed Becki Beckman RN on 1/19/2022 at [address] [place] [address]: Should you experience any significant changes in your wound(s) or have questions about your wound care, please contact the [address] [place] [address] at [address] [place] [address] 8:00 am - 4:30. If you need help with your wound outside these hours and cannot wait until we are again available, contact your PCP or go to the hospital emergency room. PLEASE NOTE: IF YOU ARE UNABLE TO OBTAIN WOUND SUPPLIES, CONTINUE TO USE THE SUPPLIES YOU HAVE AVAILABLE UNTIL YOU ARE ABLE TO REACH US. IT IS MOST IMPORTANT TO KEEP THE WOUND COVERED AT ALL TIMES.      Physician Signature:_______________________    Date: ___________ Time:  ____________

## 2022-01-19 NOTE — WOUND CARE
[address]   Progress Note and Procedure Note     [address] Lemus RECORD NUMBER:  [id number]  AGE: 27 y.o. RACE BLACK/  GENDER: female  : 1990  EPISODE DATE:  2022    Subjective:   C/o mild pain in LLE wound area, no drainage  Prior wound is healed  Relates dogs were playing near her leg and she got scratched, immediately cleansed wound area and dressed it in similar fashion. Chief Complaint   Patient presents with    Follow-up         HISTORY of PRESENT ILLNESS HPI    Kenji Brennan is a 27 y.o. female who presents today for wound/ulcer evaluation. History of Wound Context: LLE  Wound/Ulcer Pain Timing/Severity: mild  Quality of pain: aching  Severity:  1 / 10   Modifying Factors: None  Associated Signs/Symptoms: none    Ulcer Identification:  Ulcer Type: dog scratch    Contributing Factors: edema    Wound: LLE         PAST MEDICAL HISTORY    Past Medical History:   Diagnosis Date    Asthma     Hypertension         PAST SURGICAL HISTORY    Past Surgical History:   Procedure Laterality Date    HX GI      HX HEENT         FAMILY HISTORY    Family History   Problem Relation Age of Onset    Asthma Mother     No Known Problems Father     Diabetes Maternal Grandmother     Hypertension Maternal Grandmother        SOCIAL HISTORY    Social History     Tobacco Use    Smoking status: Never Smoker    Smokeless tobacco: Never Used   Substance Use Topics    Alcohol use: Yes     Comment: mixed drink occ    Drug use: No       ALLERGIES    No Known Allergies    MEDICATIONS    Current Outpatient Medications on File Prior to Encounter   Medication Sig Dispense Refill    spironolactone (ALDACTONE) 25 mg tablet Take 100 mg by mouth daily.  cyanocobalamin (VITAMIN B-12) 100 mcg tablet Take  by mouth daily.  sertraline HCl (ZOLOFT PO) Take 100 mg by mouth daily.       liraglutide (SAXENDA) 3 mg/0.5 mL (18 mg/3 mL) pen 0.6 mg by SubCUTAneous route daily.      HEADACHE RELIEF, ASA-ACET-CAF, 250-250-65 mg per tablet TAKE 1 TAB BY MOUTH EVERY EIGHT (8) HOURS AS NEEDED FOR PAIN. 50 Tab 2    levonorgestrel (MIRENA) 20 mcg/24 hr (5 years) IUD 1 Each by IntraUTERine route once.  albuterol (PROVENTIL HFA, VENTOLIN HFA, PROAIR HFA) 90 mcg/actuation inhaler Take 2 Puffs by inhalation every four (4) hours as needed for Wheezing. No current facility-administered medications on file prior to encounter. REVIEW OF SYSTEMS    Pertinent items are noted in HPI. Objective:     Visit Vitals  BP (!) 149/100   Pulse 97   Temp (!) 96.3 °F (35.7 °C)       Wt Readings from Last 3 Encounters:   07/16/19 120.2 kg (265 lb)   11/01/18 117 kg (257 lb 15 oz)   11/09/16 96.2 kg (212 lb)       PHYSICAL EXAM  LLE prior wound healed, new wound noted to be clean, granular to level of fat  01/19/22 1447    Wound Leg lower Left; Lateral #1 12/02/21   Date First Assessed/Time First Assessed: 12/02/21 0940   Present on Hospital Admission: Yes  Wound Approximate Age at First Assessment (Weeks): 4 weeks  Primary Wound Type: Trauma  Location: Leg lower  Wound Location Orientation: Left; Lateral  Wound . .. Wound Image    Wound Etiology Traumatic   Dressing Status Intact   Cleansed Cleansed with saline   Wound Length (cm) 1.5 cm   Wound Width (cm) 1.5 cm   Wound Depth (cm) 0.1 cm   Wound Surface Area (cm^2) 2.25 cm^2   Change in Wound Size % 72.96   Wound Volume (cm^3) 0.225 cm^3   Wound Healing % 95   Wound Assessment Epithelialization;Pink/red   Drainage Amount None   Wound Odor None   Kari-Wound/Incision Assessment Intact   Edges Attached edges   Wound Thickness Description Partial thickness   Wound Leg lower Left; Anterior   Date First Assessed/Time First Assessed: 01/19/22 1454   Present on Hospital Admission: No  Wound Approximate Age at First Assessment (Weeks): 0 weeks  Primary Wound Type: Traumatic  Location: Leg lower  Wound Location Orientation: Left; Anterior   Wound Image    Wound Etiology Traumatic   Cleansed Cleansed with saline   Wound Length (cm) 2.8 cm   Wound Width (cm) 2 cm   Wound Depth (cm) 0.3 cm   Wound Surface Area (cm^2) 5.6 cm^2   Wound Volume (cm^3) 1.68 cm^3   Wound Assessment Pink/red   Drainage Amount Moderate   Drainage Description Sanguineous   Wound Odor None   Kari-Wound/Incision Assessment Intact   Edges Defined edges   Wound Thickness Description Full thickness       Pertinent items are noted in HPI. Assessment:   Prior wound healed\    New wound:  Laceration without FB LLE  Ulcer left calf with fat layer exposed    Plan:   F/u vascular  Wound care new wound with betadine dsd, alert clinic if any signs of infection  Prior wound is healed left lateral calf    Treatment Note please see attached Discharge Instructions    Written patient dismissal instructions given to patient or POA.          Electronically signed by Vance Aceves DPM on 1/19/2022 at 2:41 PM

## 2022-01-19 NOTE — WOUND CARE
01/19/22 1447   Wound Leg lower Left; Lateral #1 12/02/21   Date First Assessed/Time First Assessed: 12/02/21 0940   Present on Hospital Admission: Yes  Wound Approximate Age at First Assessment (Weeks): 4 weeks  Primary Wound Type: Trauma  Location: Leg lower  Wound Location Orientation: Left; Lateral  Wound . .. Wound Image    Wound Etiology Traumatic   Dressing Status Intact   Cleansed Cleansed with saline   Wound Length (cm) 1.5 cm   Wound Width (cm) 1.5 cm   Wound Depth (cm) 0.1 cm   Wound Surface Area (cm^2) 2.25 cm^2   Change in Wound Size % 72.96   Wound Volume (cm^3) 0.225 cm^3   Wound Healing % 95   Wound Assessment Epithelialization;Pink/red   Drainage Amount None   Wound Odor None   Kari-Wound/Incision Assessment Intact   Edges Attached edges   Wound Thickness Description Partial thickness   Wound Leg lower Left; Anterior   Date First Assessed/Time First Assessed: 01/19/22 1454   Present on Hospital Admission: No  Wound Approximate Age at First Assessment (Weeks): 0 weeks  Primary Wound Type: Traumatic  Location: Leg lower  Wound Location Orientation: Left; Anterior   Wound Image    Wound Etiology Traumatic   Cleansed Cleansed with saline   Wound Length (cm) 2.8 cm   Wound Width (cm) 2 cm   Wound Depth (cm) 0.3 cm   Wound Surface Area (cm^2) 5.6 cm^2   Wound Volume (cm^3) 1.68 cm^3   Wound Assessment Pink/red   Drainage Amount Moderate   Drainage Description Sanguineous   Wound Odor None   Kari-Wound/Incision Assessment Intact   Edges Defined edges   Wound Thickness Description Full thickness

## 2022-01-26 ENCOUNTER — HOSPITAL ENCOUNTER (OUTPATIENT)
Dept: WOUND CARE | Age: 32
Discharge: HOME | End: 2022-01-26
Payer: MEDICAID

## 2022-01-26 VITALS — SYSTOLIC BLOOD PRESSURE: 159 MMHG | DIASTOLIC BLOOD PRESSURE: 105 MMHG | RESPIRATION RATE: 16 BRPM | HEART RATE: 97 BPM

## 2022-01-26 PROCEDURE — 11042 DBRDMT SUBQ TIS 1ST 20SQCM/<: CPT

## 2022-01-26 RX ORDER — AMOXICILLIN AND CLAVULANATE POTASSIUM 875; 125 MG/1; MG/1
1 TABLET, FILM COATED ORAL 2 TIMES DAILY
Qty: 28 TABLET | Refills: 0 | Status: SHIPPED | OUTPATIENT
Start: 2022-01-26 | End: 2022-02-09

## 2022-01-26 NOTE — WOUND CARE
[address]   Progress Note and Procedure Note     [address] Angela Lemus RECORD NUMBER:  [id number]  AGE: 27 y.o. RACE BLACK/  GENDER: female  : 1990  EPISODE DATE:  2022    Subjective:   C/o mild pain in LLE wound area, no drainage  Prior wound is healed  Relates dogs were playing near her leg and she got scratched, immediately cleansed wound area and dressed it in similar fashion. Chief Complaint   Patient presents with    Follow-up         HISTORY of PRESENT ILLNESS HPI    Evelina Finn is a 27 y.o. female who presents today for wound/ulcer evaluation. History of Wound Context: LLE  Wound/Ulcer Pain Timing/Severity: mild  Quality of pain: aching  Severity:  1 / 10   Modifying Factors: None  Associated Signs/Symptoms: none    Ulcer Identification:  Ulcer Type: dog scratch    Contributing Factors: edema    Wound: LLE         PAST MEDICAL HISTORY    Past Medical History:   Diagnosis Date    Asthma     Hypertension         PAST SURGICAL HISTORY    Past Surgical History:   Procedure Laterality Date    HX GI      HX HEENT         FAMILY HISTORY    Family History   Problem Relation Age of Onset    Asthma Mother     No Known Problems Father     Diabetes Maternal Grandmother     Hypertension Maternal Grandmother        SOCIAL HISTORY    Social History     Tobacco Use    Smoking status: Never Smoker    Smokeless tobacco: Never Used   Substance Use Topics    Alcohol use: Yes     Comment: mixed drink occ    Drug use: No       ALLERGIES    No Known Allergies    MEDICATIONS    Current Outpatient Medications on File Prior to Encounter   Medication Sig Dispense Refill    spironolactone (ALDACTONE) 25 mg tablet Take 100 mg by mouth daily.  cyanocobalamin (VITAMIN B-12) 100 mcg tablet Take  by mouth daily.  sertraline HCl (ZOLOFT PO) Take 100 mg by mouth daily.       liraglutide (SAXENDA) 3 mg/0.5 mL (18 mg/3 mL) pen 0.6 mg by SubCUTAneous route daily.      HEADACHE RELIEF, ASA-ACET-CAF, 250-250-65 mg per tablet TAKE 1 TAB BY MOUTH EVERY EIGHT (8) HOURS AS NEEDED FOR PAIN. 50 Tab 2    levonorgestrel (MIRENA) 20 mcg/24 hr (5 years) IUD 1 Each by IntraUTERine route once.  albuterol (PROVENTIL HFA, VENTOLIN HFA, PROAIR HFA) 90 mcg/actuation inhaler Take 2 Puffs by inhalation every four (4) hours as needed for Wheezing. No current facility-administered medications on file prior to encounter. REVIEW OF SYSTEMS    Pertinent items are noted in HPI. Objective:     Visit Vitals  BP (!) 159/105 (BP 1 Location: Left upper arm, BP Patient Position: Sitting)   Pulse 97   Resp 16       Wt Readings from Last 3 Encounters:   07/16/19 120.2 kg (265 lb)   11/01/18 117 kg (257 lb 15 oz)   11/09/16 96.2 kg (212 lb)       PHYSICAL EXAM  LLE prior wound healed, new wound noted to be clean, granular to level of fat    01/26/22 1519    Left Leg Edema Point of Measurement   Leg circumference 42.5 cm   Ankle circumference 26 cm   Compression Therapy Tubular elastic support bandage   Wound Leg lower Left;Medial   Date First Assessed/Time First Assessed: 01/19/22 1454   Present on Hospital Admission: No  Wound Approximate Age at First Assessment (Weeks): 0 weeks  Primary Wound Type: Traumatic  Location: Leg lower  Wound Location Orientation: Left;Medial   Wound Image    Wound Etiology Traumatic   Dressing Status Intact; New drainage noted; Old drainage noted  (removed large bandaid)   Cleansed Cleansed with saline   Wound Length (cm) 3.2 cm   Wound Width (cm) 1.9 cm   Wound Depth (cm) 0.2 cm   Wound Surface Area (cm^2) 6.08 cm^2   Change in Wound Size % -8.57   Wound Volume (cm^3) 1.216 cm^3   Wound Healing % 28   Wound Assessment Fibrin;Pink/red;Slough   Drainage Amount Small   Drainage Description Serosanguinous   Wound Odor None   Kari-Wound/Incision Assessment Intact   Edges Defined edges   Wound Thickness Description Full thickness   Wound Leg lower Left; Lateral #1 12/02/21   Date First Assessed/Time First Assessed: 12/02/21 0940   Present on Hospital Admission: Yes  Wound Approximate Age at First Assessment (Weeks): 4 weeks  Primary Wound Type: Trauma  Location: Leg lower  Wound Location Orientation: Left; Lateral  Wound . .. Wound Etiology Traumatic   Dressing Status Other (Comment)  (open to air)   Wound Length (cm) 0.1 cm   Wound Width (cm) 0.1 cm   Wound Depth (cm) 0.1 cm   Wound Surface Area (cm^2) 0.01 cm^2   Change in Wound Size % 99.88   Wound Volume (cm^3) 0.001 cm^3   Wound Healing % 100   Wound Assessment Epithelialization   Drainage Amount None   Wound Odor None   Kari-Wound/Incision Assessment Intact   Pain 1   Pain Scale 1 Numeric (0 - 10)   Pain Intensity 1 0       PRIOR      01/19/22 1447    Wound Leg lower Left; Lateral #1 12/02/21   Date First Assessed/Time First Assessed: 12/02/21 0940   Present on Hospital Admission: Yes  Wound Approximate Age at First Assessment (Weeks): 4 weeks  Primary Wound Type: Trauma  Location: Leg lower  Wound Location Orientation: Left; Lateral  Wound . .. Wound Image    Wound Etiology Traumatic   Dressing Status Intact   Cleansed Cleansed with saline   Wound Length (cm) 1.5 cm   Wound Width (cm) 1.5 cm   Wound Depth (cm) 0.1 cm   Wound Surface Area (cm^2) 2.25 cm^2   Change in Wound Size % 72.96   Wound Volume (cm^3) 0.225 cm^3   Wound Healing % 95   Wound Assessment Epithelialization;Pink/red   Drainage Amount None   Wound Odor None   Kari-Wound/Incision Assessment Intact   Edges Attached edges   Wound Thickness Description Partial thickness   Wound Leg lower Left; Anterior   Date First Assessed/Time First Assessed: 01/19/22 1454   Present on Hospital Admission: No  Wound Approximate Age at First Assessment (Weeks): 0 weeks  Primary Wound Type: Traumatic  Location: Leg lower  Wound Location Orientation: Left; Anterior   Wound Image    Wound Etiology Traumatic   Cleansed Cleansed with saline   Wound Length (cm) 2.8 cm Wound Width (cm) 2 cm   Wound Depth (cm) 0.3 cm   Wound Surface Area (cm^2) 5.6 cm^2   Wound Volume (cm^3) 1.68 cm^3   Wound Assessment Pink/red   Drainage Amount Moderate   Drainage Description Sanguineous   Wound Odor None   Kari-Wound/Incision Assessment Intact   Edges Defined edges   Wound Thickness Description Full thickness       Pertinent items are noted in HPI. Assessment:   Prior wound healed\    New wound:  Laceration without FB LLE  Ulcer left calf with fat layer exposed    Plan:   F/u vascular  Wound care new wound with betadine dsd, alert clinic if any signs of infection  Prior wound is healed left lateral calf  Debrided wound per procedure note below  rx 2 week of oral abx, discussed med education with pt, rx augmentin 875 every 12 hours for two weeks    Treatment Note please see attached Discharge Instructions    Written patient dismissal instructions given to patient or POA. Electronically signed by Quincy Silver DPM on 1/26/2022 at 2:41 PM        Procedure Note:  Excisional debridement through level of fat. Location / Ulcer: left calf  Indication: to remove non-viable tissue from wound bed. Consent in chart. Anesthesia: none  Instrument: curette  Residual necrosis: none  Bleeding: minimal  Hemostasis: compresion  Pre-Procedure Pain: 0  Post-Procedure Pain: 6  Area debrided < 20 cm sq. Pre-Debridement measurements: see nursing notes  Post-Debridement measurements: see nursing notes, add depth of 0.1 cm  This is part of a series of staged procedures in an attempt at limb salvage.

## 2022-01-26 NOTE — WOUND CARE
01/26/22 1519   Left Leg Edema Point of Measurement   Leg circumference 42.5 cm   Ankle circumference 26 cm   Compression Therapy Tubular elastic support bandage   Wound Leg lower Left;Medial   Date First Assessed/Time First Assessed: 01/19/22 1454   Present on Hospital Admission: No  Wound Approximate Age at First Assessment (Weeks): 0 weeks  Primary Wound Type: Traumatic  Location: Leg lower  Wound Location Orientation: Left;Medial   Wound Image    Wound Etiology Traumatic   Dressing Status Intact; New drainage noted; Old drainage noted  (removed large bandaid)   Cleansed Cleansed with saline   Wound Length (cm) 3.2 cm   Wound Width (cm) 1.9 cm   Wound Depth (cm) 0.2 cm   Wound Surface Area (cm^2) 6.08 cm^2   Change in Wound Size % -8.57   Wound Volume (cm^3) 1.216 cm^3   Wound Healing % 28   Wound Assessment Fibrin;Pink/red;Slough   Drainage Amount Small   Drainage Description Serosanguinous   Wound Odor None   Kari-Wound/Incision Assessment Intact   Edges Defined edges   Wound Thickness Description Full thickness   Wound Leg lower Left; Lateral #1 12/02/21   Date First Assessed/Time First Assessed: 12/02/21 0940   Present on Hospital Admission: Yes  Wound Approximate Age at First Assessment (Weeks): 4 weeks  Primary Wound Type: Trauma  Location: Leg lower  Wound Location Orientation: Left; Lateral  Wound . ..    Wound Etiology Traumatic   Dressing Status Other (Comment)  (open to air)   Wound Length (cm) 0.1 cm   Wound Width (cm) 0.1 cm   Wound Depth (cm) 0.1 cm   Wound Surface Area (cm^2) 0.01 cm^2   Change in Wound Size % 99.88   Wound Volume (cm^3) 0.001 cm^3   Wound Healing % 100   Wound Assessment Epithelialization   Drainage Amount None   Wound Odor None   Kari-Wound/Incision Assessment Intact   Pain 1   Pain Scale 1 Numeric (0 - 10)   Pain Intensity 1 0     Visit Vitals  BP (!) 159/105 (BP 1 Location: Left upper arm, BP Patient Position: Sitting)   Pulse 97   Resp 16

## 2022-01-26 NOTE — DISCHARGE INSTRUCTIONS
Discharge Instructions for          [place] [address] [facility], [address]  Phone: [phone] Fax: [phone]    NAME:  Denton Ramos  YOB: 1990  MEDICAL RECORD NUMBER:  [id number]  DATE:  1/26/2022  WOUND CARE ORDERS:  Left Lower Leg:   Cleanse with soap and water. Apply betadine to wound bed, secure with gauze and secure with tape. Wear double Tubiegrip size F. Change daily. Obtain vein studies. Take antibiotics as prescribed. TREATMENT ORDERS:    Elevate leg(s) above the level of the heart when sitting. Avoid prolonged standing in one place. Do no get dressing/wrap wet. Follow Diet as prescribed:   [] Diet as tolerated: [] Calorie Diabetic Diet: Low carb and no Sugar [x] No Added Salt:  [x] Increase Protein: [] Limit the amount of liquid you are drinking and avoid drinking in between meals           Return Appointment:  [x] Return Appointment: With Dr Kallie Trevino in [facility])  [] Nurse Visit : *** days  [] Ordered tests:    Electronically signed Kanika Biggs RN on 1/26/2022 at 3:33 PM     [address] [facility] Information: Should you experience any significant changes in your wound(s) or have questions about your wound care, please contact the [address] [place] [address] at [address] [place] [address] 8:00 am - 4:30. If you need help with your wound outside these hours and cannot wait until we are again available, contact your PCP or go to the hospital emergency room. PLEASE NOTE: IF YOU ARE UNABLE TO OBTAIN WOUND SUPPLIES, CONTINUE TO USE THE SUPPLIES YOU HAVE AVAILABLE UNTIL YOU ARE ABLE TO REACH US. IT IS MOST IMPORTANT TO KEEP THE WOUND COVERED AT ALL TIMES.      Physician Signature:_______________________    Date: ___________ Time:  ____________

## 2022-01-26 NOTE — WOUND CARE
01/26/22 1550   Wound Leg lower Left;Medial   Date First Assessed/Time First Assessed: 01/19/22 1454   Present on Hospital Admission: No  Wound Approximate Age at First Assessment (Weeks): 0 weeks  Primary Wound Type: Traumatic  Location: Leg lower  Wound Location Orientation: Left;Medial   Dressing/Treatment   (betadine, gauze, tape, double tubi size F)

## 2022-02-09 ENCOUNTER — HOSPITAL ENCOUNTER (OUTPATIENT)
Dept: WOUND CARE | Age: 32
Discharge: HOME | End: 2022-02-09
Payer: MEDICAID

## 2022-02-09 VITALS — RESPIRATION RATE: 16 BRPM | TEMPERATURE: 97.7 F | DIASTOLIC BLOOD PRESSURE: 112 MMHG | SYSTOLIC BLOOD PRESSURE: 162 MMHG

## 2022-02-09 PROCEDURE — 11042 DBRDMT SUBQ TIS 1ST 20SQCM/<: CPT

## 2022-02-09 NOTE — WOUND CARE
02/09/22 1402   Left Leg Edema Point of Measurement   Leg circumference 42 cm   Ankle circumference 25 cm   Compression Therapy Tubular elastic support bandage   LLE Peripheral Vascular    Capillary Refill Less than/equal to 3 seconds   Color Appropriate for race   Temperature Warm   Pedal Pulse Palpable   Wound Leg lower Left;Medial   Date First Assessed/Time First Assessed: 01/19/22 1454   Present on Hospital Admission: No  Wound Approximate Age at First Assessment (Weeks): 0 weeks  Primary Wound Type: Traumatic  Location: Leg lower  Wound Location Orientation: Left;Medial   Wound Etiology Traumatic   Dressing Status Intact   Cleansed Cleansed with saline   Wound Length (cm) 2.8 cm   Wound Width (cm) 2 cm   Wound Depth (cm) 0.1 cm   Wound Surface Area (cm^2) 5.6 cm^2   Change in Wound Size % 0   Wound Volume (cm^3) 0.56 cm^3   Wound Healing % 67   Wound Assessment Fibrin;Pink/red   Drainage Amount Small   Drainage Description Serosanguinous   Wound Odor None   Kari-Wound/Incision Assessment Intact   Edges Defined edges   Wound Thickness Description Full thickness   Pain 1   Pain Scale 1 Numeric (0 - 10)   Pain Intensity 1 1   Patient Stated Pain Goal 0     Visit Vitals  BP (!) 162/112 (BP 1 Location: Left upper arm, BP Patient Position: At rest)   Temp 97.7 °F (36.5 °C)   Resp 16

## 2022-02-09 NOTE — DISCHARGE INSTRUCTIONS
Discharge Instructions for          [place] [address] [facility], [address]  Phone: [phone] Fax: [phone]    NAME:  Clarke Lanes  YOB: 1990  MEDICAL RECORD NUMBER:  [id number]  DATE:  2/9/2022  WOUND CARE ORDERS:  Left Lower Leg:   Cleanse with soap and water. Apply medihoney gel to wound bed, secure with gauze and secure with tape. Wear double tubigrip size F. Change daily. TREATMENT ORDERS:    Elevate leg(s) above the level of the heart when sitting. Avoid prolonged standing in one place. Do no get dressing/wrap wet. Follow Diet as prescribed:   [] Diet as tolerated: [] Calorie Diabetic Diet: Low carb and no Sugar [x] No Added Salt:  [x] Increase Protein: [] Limit the amount of liquid you are drinking and avoid drinking in between meals           Return Appointment:  [x] Return Appointment: With Dr Army Fischer in [address] [place] [address])  [] Nurse Visit : *** days  [] Ordered tests:    Electronically signed Severa Rex, RN on 2/9/2022 at 2:17 PM     [address] [facility] [address] Information: Should you experience any significant changes in your wound(s) or have questions about your wound care, please contact the [address] [place] [address] at [address] [place] [address] Street 8:00 am - 4:30. If you need help with your wound outside these hours and cannot wait until we are again available, contact your PCP or go to the hospital emergency room. PLEASE NOTE: IF YOU ARE UNABLE TO OBTAIN WOUND SUPPLIES, CONTINUE TO USE THE SUPPLIES YOU HAVE AVAILABLE UNTIL YOU ARE ABLE TO REACH US. IT IS MOST IMPORTANT TO KEEP THE WOUND COVERED AT ALL TIMES.      Physician Signature:_______________________    Date: ___________ Time:  ____________

## 2022-02-09 NOTE — WOUND CARE
[address]   Progress Note and Procedure Note     [address] Angela Lemus RECORD NUMBER:  [id number]  AGE: 27 y.o. RACE BLACK/  GENDER: female  : 1990  EPISODE DATE:  2022    Subjective:   C/o mild pain in LLE wound area, no drainage  Prior wound is healed  Relates dogs were playing near her leg and she got scratched, immediately cleansed wound area and dressed it in similar fashion  Redness improving, finishes abx in a few days. Chief Complaint   Patient presents with    Follow-up         HISTORY of PRESENT ILLNESS HPI    Park Ferrari is a 27 y.o. female who presents today for wound/ulcer evaluation. History of Wound Context: LLE  Wound/Ulcer Pain Timing/Severity: mild  Quality of pain: aching  Severity:  1 / 10   Modifying Factors: None  Associated Signs/Symptoms: none    Ulcer Identification:  Ulcer Type: dog scratch    Contributing Factors: edema    Wound: LLE         PAST MEDICAL HISTORY    Past Medical History:   Diagnosis Date    Asthma     Hypertension         PAST SURGICAL HISTORY    Past Surgical History:   Procedure Laterality Date    HX GI      HX HEENT         FAMILY HISTORY    Family History   Problem Relation Age of Onset    Asthma Mother     No Known Problems Father     Diabetes Maternal Grandmother     Hypertension Maternal Grandmother        SOCIAL HISTORY    Social History     Tobacco Use    Smoking status: Never Smoker    Smokeless tobacco: Never Used   Substance Use Topics    Alcohol use: Yes     Comment: mixed drink occ    Drug use: No       ALLERGIES    No Known Allergies    MEDICATIONS    Current Outpatient Medications on File Prior to Encounter   Medication Sig Dispense Refill    amoxicillin-clavulanate (AUGMENTIN) 875-125 mg per tablet Take 1 Tablet by mouth two (2) times a day for 14 days.  Indications: skin infection due to Staphylococcus aureus bacteria 28 Tablet 0    spironolactone (ALDACTONE) 25 mg tablet Take 100 mg by mouth daily.  cyanocobalamin (VITAMIN B-12) 100 mcg tablet Take  by mouth daily.  sertraline HCl (ZOLOFT PO) Take 100 mg by mouth daily.  liraglutide (SAXENDA) 3 mg/0.5 mL (18 mg/3 mL) pen 0.6 mg by SubCUTAneous route daily.  HEADACHE RELIEF, ASA-ACET-CAF, 250-250-65 mg per tablet TAKE 1 TAB BY MOUTH EVERY EIGHT (8) HOURS AS NEEDED FOR PAIN. 50 Tab 2    levonorgestrel (MIRENA) 20 mcg/24 hr (5 years) IUD 1 Each by IntraUTERine route once.  albuterol (PROVENTIL HFA, VENTOLIN HFA, PROAIR HFA) 90 mcg/actuation inhaler Take 2 Puffs by inhalation every four (4) hours as needed for Wheezing. No current facility-administered medications on file prior to encounter. REVIEW OF SYSTEMS    Pertinent items are noted in HPI. Objective:     Visit Vitals  BP (!) 162/112 (BP 1 Location: Left upper arm, BP Patient Position: At rest)   Temp 97.7 °F (36.5 °C)   Resp 16       Wt Readings from Last 3 Encounters:   07/16/19 120.2 kg (265 lb)   11/01/18 117 kg (257 lb 15 oz)   11/09/16 96.2 kg (212 lb)       PHYSICAL EXAM  LLE prior wound healed, wound noted to be clean, mixed granular and fibrotic  to level of fat, measurements per rn note    PRIOR    01/26/22 1519    Left Leg Edema Point of Measurement   Leg circumference 42.5 cm   Ankle circumference 26 cm   Compression Therapy Tubular elastic support bandage   Wound Leg lower Left;Medial   Date First Assessed/Time First Assessed: 01/19/22 1454   Present on Hospital Admission: No  Wound Approximate Age at First Assessment (Weeks): 0 weeks  Primary Wound Type: Traumatic  Location: Leg lower  Wound Location Orientation: Left;Medial   Wound Image    Wound Etiology Traumatic   Dressing Status Intact; New drainage noted; Old drainage noted  (removed large bandaid)   Cleansed Cleansed with saline   Wound Length (cm) 3.2 cm   Wound Width (cm) 1.9 cm   Wound Depth (cm) 0.2 cm   Wound Surface Area (cm^2) 6.08 cm^2   Change in Wound Size % -8.57 Wound Volume (cm^3) 1.216 cm^3   Wound Healing % 28   Wound Assessment Fibrin;Pink/red;Slough   Drainage Amount Small   Drainage Description Serosanguinous   Wound Odor None   Kari-Wound/Incision Assessment Intact   Edges Defined edges   Wound Thickness Description Full thickness   Wound Leg lower Left; Lateral #1 12/02/21   Date First Assessed/Time First Assessed: 12/02/21 0940   Present on Hospital Admission: Yes  Wound Approximate Age at First Assessment (Weeks): 4 weeks  Primary Wound Type: Trauma  Location: Leg lower  Wound Location Orientation: Left; Lateral  Wound . .. Wound Etiology Traumatic   Dressing Status Other (Comment)  (open to air)   Wound Length (cm) 0.1 cm   Wound Width (cm) 0.1 cm   Wound Depth (cm) 0.1 cm   Wound Surface Area (cm^2) 0.01 cm^2   Change in Wound Size % 99.88   Wound Volume (cm^3) 0.001 cm^3   Wound Healing % 100   Wound Assessment Epithelialization   Drainage Amount None   Wound Odor None   Kari-Wound/Incision Assessment Intact   Pain 1   Pain Scale 1 Numeric (0 - 10)   Pain Intensity 1 0             01/19/22 1447    Wound Leg lower Left; Lateral #1 12/02/21   Date First Assessed/Time First Assessed: 12/02/21 0940   Present on Hospital Admission: Yes  Wound Approximate Age at First Assessment (Weeks): 4 weeks  Primary Wound Type: Trauma  Location: Leg lower  Wound Location Orientation: Left; Lateral  Wound . .. Wound Image    Wound Etiology Traumatic   Dressing Status Intact   Cleansed Cleansed with saline   Wound Length (cm) 1.5 cm   Wound Width (cm) 1.5 cm   Wound Depth (cm) 0.1 cm   Wound Surface Area (cm^2) 2.25 cm^2   Change in Wound Size % 72.96   Wound Volume (cm^3) 0.225 cm^3   Wound Healing % 95   Wound Assessment Epithelialization;Pink/red   Drainage Amount None   Wound Odor None   Kari-Wound/Incision Assessment Intact   Edges Attached edges   Wound Thickness Description Partial thickness   Wound Leg lower Left; Anterior   Date First Assessed/Time First Assessed: 01/19/22 1454   Present on Hospital Admission: No  Wound Approximate Age at First Assessment (Weeks): 0 weeks  Primary Wound Type: Traumatic  Location: Leg lower  Wound Location Orientation: Left; Anterior   Wound Image    Wound Etiology Traumatic   Cleansed Cleansed with saline   Wound Length (cm) 2.8 cm   Wound Width (cm) 2 cm   Wound Depth (cm) 0.3 cm   Wound Surface Area (cm^2) 5.6 cm^2   Wound Volume (cm^3) 1.68 cm^3   Wound Assessment Pink/red   Drainage Amount Moderate   Drainage Description Sanguineous   Wound Odor None   Kari-Wound/Incision Assessment Intact   Edges Defined edges   Wound Thickness Description Full thickness       Pertinent items are noted in HPI. Assessment:   Prior wound healed\    New wound:  Laceration without FB LLE  Ulcer left calf with fat layer exposed    Plan:   F/u vascular  Wound care new wound with medihoney dsd  Prior wound is healed left lateral calf  Debrided wound per procedure note below  Pt to finish 2 week of oral abx, discussed med education with pt, on augmentin 875 every 12 hours for two weeks; has couple days left    Treatment Note please see attached Discharge Instructions    Written patient dismissal instructions given to patient or POA. Electronically signed by Rosana Vera DPM on 2/9/2022 at 2:41 PM        Procedure Note:  Excisional debridement through level of fat. Location / Ulcer: left calf  Indication: to remove non-viable tissue from wound bed. Consent in chart. Anesthesia: none  Instrument: curette  Residual necrosis: none  Bleeding: minimal  Hemostasis: compresion  Pre-Procedure Pain: 0  Post-Procedure Pain: 6  Area debrided < 20 cm sq. Pre-Debridement measurements: see nursing notes  Post-Debridement measurements: see nursing notes, add depth of 0.1 cm  This is part of a series of staged procedures in an attempt at limb salvage.

## 2022-02-23 ENCOUNTER — HOSPITAL ENCOUNTER (OUTPATIENT)
Dept: WOUND CARE | Age: 32
Discharge: HOME | End: 2022-02-23
Payer: MEDICAID

## 2022-02-23 VITALS
RESPIRATION RATE: 16 BRPM | DIASTOLIC BLOOD PRESSURE: 87 MMHG | HEART RATE: 107 BPM | TEMPERATURE: 97.5 F | SYSTOLIC BLOOD PRESSURE: 144 MMHG

## 2022-02-23 PROCEDURE — 11042 DBRDMT SUBQ TIS 1ST 20SQCM/<: CPT

## 2022-02-23 NOTE — WOUND CARE
02/23/22 1345   Wound Leg lower Left;Medial   Date First Assessed/Time First Assessed: 01/19/22 1454   Present on Hospital Admission: No  Wound Approximate Age at First Assessment (Weeks): 0 weeks  Primary Wound Type: Traumatic  Location: Leg lower  Wound Location Orientation: Left;Medial   Wound Image    Wound Etiology Traumatic   Dressing Status Intact   Cleansed Cleansed with saline   Wound Length (cm) 1.2 cm   Wound Width (cm) 1.9 cm   Wound Depth (cm) 0.3 cm   Wound Surface Area (cm^2) 2.28 cm^2   Change in Wound Size % 59.29   Wound Volume (cm^3) 0.684 cm^3   Wound Healing % 59   Wound Assessment Pink/red   Drainage Amount Small   Drainage Description Serosanguinous   Wound Odor None   Kari-Wound/Incision Assessment Intact   Edges Defined edges   Wound Thickness Description Full thickness     Visit Vitals  BP (!) 144/87 (BP 1 Location: Left upper arm, BP Patient Position: At rest)   Pulse (!) 107   Temp 97.5 °F (36.4 °C)   Resp 16

## 2022-02-23 NOTE — WOUND CARE
02/23/22 1502   Wound Leg lower Left;Medial   Date First Assessed/Time First Assessed: 01/19/22 1454   Present on Hospital Admission: No  Wound Approximate Age at First Assessment (Weeks): 0 weeks  Primary Wound Type: Traumatic  Location: Leg lower  Wound Location Orientation: Left;Medial   Dressing/Treatment   (leslie, gauze, roll gauze, tape, double tubi size F)

## 2022-02-23 NOTE — WOUND CARE
[address]   Progress Note and Procedure Note     [id number] Lemus RECORD NUMBER:  [id number]  AGE: 27 y.o. RACE BLACK/  GENDER: female  : 1990  EPISODE DATE:  2022    Subjective:   C/o mild pain in LLE wound area, no drainage  Prior wound is healed  Relates dogs were playing near her leg and she got scratched, immediately cleansed wound area and dressed it in similar fashion  Redness improving, finished course of oral abx    Chief Complaint   Patient presents with    Follow-up         HISTORY of PRESENT ILLNESS HPI    Teri Pierre is a 27 y.o. female who presents today for wound/ulcer evaluation. History of Wound Context: LLE  Wound/Ulcer Pain Timing/Severity: mild  Quality of pain: aching  Severity:  1 / 10   Modifying Factors: None  Associated Signs/Symptoms: none    Ulcer Identification:  Ulcer Type: dog scratch    Contributing Factors: edema    Wound: LLE         PAST MEDICAL HISTORY    Past Medical History:   Diagnosis Date    Asthma     Hypertension         PAST SURGICAL HISTORY    Past Surgical History:   Procedure Laterality Date    HX GI      HX HEENT         FAMILY HISTORY    Family History   Problem Relation Age of Onset    Asthma Mother     No Known Problems Father     Diabetes Maternal Grandmother     Hypertension Maternal Grandmother        SOCIAL HISTORY    Social History     Tobacco Use    Smoking status: Never Smoker    Smokeless tobacco: Never Used   Substance Use Topics    Alcohol use: Yes     Comment: mixed drink occ    Drug use: No       ALLERGIES    No Known Allergies    MEDICATIONS    Current Outpatient Medications on File Prior to Encounter   Medication Sig Dispense Refill    spironolactone (ALDACTONE) 25 mg tablet Take 100 mg by mouth daily.  cyanocobalamin (VITAMIN B-12) 100 mcg tablet Take  by mouth daily.  sertraline HCl (ZOLOFT PO) Take 100 mg by mouth daily.       liraglutide (SAXENDA) 3 mg/0.5 mL (18 mg/3 mL) pen 0.6 mg by SubCUTAneous route daily.  HEADACHE RELIEF, ASA-ACET-CAF, 250-250-65 mg per tablet TAKE 1 TAB BY MOUTH EVERY EIGHT (8) HOURS AS NEEDED FOR PAIN. 50 Tab 2    levonorgestrel (MIRENA) 20 mcg/24 hr (5 years) IUD 1 Each by IntraUTERine route once.  albuterol (PROVENTIL HFA, VENTOLIN HFA, PROAIR HFA) 90 mcg/actuation inhaler Take 2 Puffs by inhalation every four (4) hours as needed for Wheezing. No current facility-administered medications on file prior to encounter. REVIEW OF SYSTEMS    Pertinent items are noted in HPI. Objective:     Visit Vitals  BP (!) 144/87 (BP 1 Location: Left upper arm, BP Patient Position: At rest)   Pulse (!) 107   Temp 97.5 °F (36.4 °C)   Resp 16       Wt Readings from Last 3 Encounters:   07/16/19 120.2 kg (265 lb)   11/01/18 117 kg (257 lb 15 oz)   11/09/16 96.2 kg (212 lb)       PHYSICAL EXAM  LLE prior wound healed,   new wound noted to be clean, 100% to level of fat, measurements per rn note    PRIOR    01/26/22 1519    Left Leg Edema Point of Measurement   Leg circumference 42.5 cm   Ankle circumference 26 cm   Compression Therapy Tubular elastic support bandage   Wound Leg lower Left;Medial   Date First Assessed/Time First Assessed: 01/19/22 1454   Present on Hospital Admission: No  Wound Approximate Age at First Assessment (Weeks): 0 weeks  Primary Wound Type: Traumatic  Location: Leg lower  Wound Location Orientation: Left;Medial   Wound Image    Wound Etiology Traumatic   Dressing Status Intact; New drainage noted; Old drainage noted  (removed large bandaid)   Cleansed Cleansed with saline   Wound Length (cm) 3.2 cm   Wound Width (cm) 1.9 cm   Wound Depth (cm) 0.2 cm   Wound Surface Area (cm^2) 6.08 cm^2   Change in Wound Size % -8.57   Wound Volume (cm^3) 1.216 cm^3   Wound Healing % 28   Wound Assessment Fibrin;Pink/red;Slough   Drainage Amount Small   Drainage Description Serosanguinous   Wound Odor None   Kari-Wound/Incision Assessment Intact   Edges Defined edges   Wound Thickness Description Full thickness   Wound Leg lower Left; Lateral #1 12/02/21   Date First Assessed/Time First Assessed: 12/02/21 0940   Present on Hospital Admission: Yes  Wound Approximate Age at First Assessment (Weeks): 4 weeks  Primary Wound Type: Trauma  Location: Leg lower  Wound Location Orientation: Left; Lateral  Wound . .. Wound Etiology Traumatic   Dressing Status Other (Comment)  (open to air)   Wound Length (cm) 0.1 cm   Wound Width (cm) 0.1 cm   Wound Depth (cm) 0.1 cm   Wound Surface Area (cm^2) 0.01 cm^2   Change in Wound Size % 99.88   Wound Volume (cm^3) 0.001 cm^3   Wound Healing % 100   Wound Assessment Epithelialization   Drainage Amount None   Wound Odor None   Kari-Wound/Incision Assessment Intact   Pain 1   Pain Scale 1 Numeric (0 - 10)   Pain Intensity 1 0             01/19/22 1447    Wound Leg lower Left; Lateral #1 12/02/21   Date First Assessed/Time First Assessed: 12/02/21 0940   Present on Hospital Admission: Yes  Wound Approximate Age at First Assessment (Weeks): 4 weeks  Primary Wound Type: Trauma  Location: Leg lower  Wound Location Orientation: Left; Lateral  Wound . .. Wound Image    Wound Etiology Traumatic   Dressing Status Intact   Cleansed Cleansed with saline   Wound Length (cm) 1.5 cm   Wound Width (cm) 1.5 cm   Wound Depth (cm) 0.1 cm   Wound Surface Area (cm^2) 2.25 cm^2   Change in Wound Size % 72.96   Wound Volume (cm^3) 0.225 cm^3   Wound Healing % 95   Wound Assessment Epithelialization;Pink/red   Drainage Amount None   Wound Odor None   Kari-Wound/Incision Assessment Intact   Edges Attached edges   Wound Thickness Description Partial thickness   Wound Leg lower Left; Anterior   Date First Assessed/Time First Assessed: 01/19/22 1454   Present on Hospital Admission: No  Wound Approximate Age at First Assessment (Weeks): 0 weeks  Primary Wound Type: Traumatic  Location: Leg lower  Wound Location Orientation: Left;Anterior   Wound Image    Wound Etiology Traumatic   Cleansed Cleansed with saline   Wound Length (cm) 2.8 cm   Wound Width (cm) 2 cm   Wound Depth (cm) 0.3 cm   Wound Surface Area (cm^2) 5.6 cm^2   Wound Volume (cm^3) 1.68 cm^3   Wound Assessment Pink/red   Drainage Amount Moderate   Drainage Description Sanguineous   Wound Odor None   Kari-Wound/Incision Assessment Intact   Edges Defined edges   Wound Thickness Description Full thickness       Pertinent items are noted in HPI. Assessment:   Prior wound healed\    New wound:  Laceration without FB LLE  Ulcer left calf with fat layer exposed    Plan:   F/u vascular  Wound care new wound with leslie dsd  Prior wound is healed left lateral calf  Debrided wound per procedure note below  finished course of augmentin    Treatment Note please see attached Discharge Instructions    Written patient dismissal instructions given to patient or POA. Electronically signed by Rosana Vera DPM on 2/23/2022 at 2:41 PM        Procedure Note:  Excisional debridement through level of fat. Location / Ulcer: left calf  Indication: to remove non-viable tissue from wound bed. Consent in chart. Anesthesia: none  Instrument: curette  Residual necrosis: none  Bleeding: minimal  Hemostasis: compresion  Pre-Procedure Pain: 0  Post-Procedure Pain: 3  Area debrided < 20 cm sq. Pre-Debridement measurements: see nursing notes  Post-Debridement measurements: see nursing notes, add depth of 0.1 cm  This is part of a series of staged procedures in an attempt at limb salvage.

## 2022-02-23 NOTE — DISCHARGE INSTRUCTIONS
Discharge Instructions/Wound Orders  [place] [address] [facility], [address]  Telephone: [phone]    NAME:  Chiqui Mendez OF BIRTH:  1990  MEDICAL RECORD NUMBER:  [id number]  DATE:  2/23/2022  Wound Care Orders:  Left Lower Leg:   Cleanse with soap and water. Apply leslie to wound bed. Cover with gauze and roll gauze. Secure with tape. Wear double tubigrip size F. Change daily. Dietary:  [x] Diet as tolerated: [] Calorie Diabetic Diet:Low carb and no Sugar [] No Added Salt:[x] Increase Protein:   [] Other:Limit the amount of liquid you are drinking and avoid drinking in between meals   Activity:  [x] Activity as tolerated:  [] Patient has no activity restrictions     [] Strict Bedrest: [] Remain off Work:     [] May return to full duty work:                                   [] Return to work with restrictions:             Return Appointment:  [x] Return Appointment: With Dr Christy Garcia in 2 weeks. [] Ordered tests:    Electronically signed Nadia De La O RN on 2/23/2022 at [address]: Should you experience any significant changes in your wound(s) or have questions about your wound care, please contact the [address] [place] [address] at [address] [place] [id number] 8:00 am - 4:30. If you need help with your wound outside these hours and cannot wait until we are again available, contact your PCP or go to the hospital emergency room. PLEASE NOTE: IF YOU ARE UNABLE TO OBTAIN WOUND SUPPLIES, CONTINUE TO USE THE SUPPLIES YOU HAVE AVAILABLE UNTIL YOU ARE ABLE TO REACH US. IT IS MOST IMPORTANT TO KEEP THE WOUND COVERED AT ALL TIMES.      Physician Signature:_______________________    Date: ___________ Time:  ____________

## 2022-03-19 PROBLEM — I87.312 IDIOPATHIC CHRONIC VENOUS HYPERTENSION OF LEFT LOWER EXTREMITY WITH ULCER (HCC): Status: ACTIVE | Noted: 2019-08-08

## 2022-03-19 PROBLEM — S81.812A LACERATION OF LEFT LOWER EXTREMITY: Status: ACTIVE | Noted: 2019-08-08

## 2022-03-19 PROBLEM — L97.923 NON-PRESSURE CHRONIC ULCER OF LEFT LOWER LEG WITH NECROSIS OF MUSCLE (HCC): Status: ACTIVE | Noted: 2019-08-08

## 2022-03-19 PROBLEM — L97.929 IDIOPATHIC CHRONIC VENOUS HYPERTENSION OF LEFT LOWER EXTREMITY WITH ULCER (HCC): Status: ACTIVE | Noted: 2019-08-08

## 2022-12-05 PROBLEM — R73.03 PRE-DIABETES: Status: ACTIVE | Noted: 2022-12-05

## 2024-02-27 ENCOUNTER — HOSPITAL ENCOUNTER (OUTPATIENT)
Facility: HOSPITAL | Age: 34
Discharge: HOME | End: 2024-03-01
Payer: MEDICAID

## 2024-02-27 ENCOUNTER — HOSPITAL ENCOUNTER (OUTPATIENT)
Facility: HOSPITAL | Age: 34
Discharge: HOME | End: 2024-02-29
Payer: MEDICAID

## 2024-02-27 DIAGNOSIS — I83.91 ASYMPTOMATIC VARICOSE VEINS OF LOWER EXTREMITY, RIGHT: ICD-10-CM

## 2024-02-27 DIAGNOSIS — M25.532 LEFT WRIST PAIN: ICD-10-CM

## 2024-02-27 PROCEDURE — 93971 EXTREMITY STUDY: CPT

## 2024-02-27 PROCEDURE — 73110 X-RAY EXAM OF WRIST: CPT

## 2024-10-17 ENCOUNTER — HOSPITAL ENCOUNTER (OUTPATIENT)
Facility: HOSPITAL | Age: 34
Discharge: HOME | End: 2024-10-17
Attending: RADIOLOGY
Payer: MEDICAID

## 2024-10-17 VITALS
HEART RATE: 94 BPM | SYSTOLIC BLOOD PRESSURE: 139 MMHG | TEMPERATURE: 97.8 F | RESPIRATION RATE: 20 BRPM | DIASTOLIC BLOOD PRESSURE: 100 MMHG

## 2024-10-17 DIAGNOSIS — L97.822 NON-PRESSURE CHRONIC ULCER OF OTHER PART OF LEFT LOWER LEG WITH FAT LAYER EXPOSED (HCC): Primary | ICD-10-CM

## 2024-10-17 PROCEDURE — 11042 DBRDMT SUBQ TIS 1ST 20SQCM/<: CPT

## 2024-10-17 PROCEDURE — 99203 OFFICE O/P NEW LOW 30 MIN: CPT

## 2024-10-17 RX ORDER — METOPROLOL SUCCINATE 25 MG/1
25 TABLET, EXTENDED RELEASE ORAL DAILY
COMMUNITY

## 2024-10-17 RX ORDER — TRIAMCINOLONE ACETONIDE 1 MG/G
OINTMENT TOPICAL ONCE
OUTPATIENT
Start: 2024-10-17 | End: 2024-10-17

## 2024-10-17 RX ORDER — GENTAMICIN SULFATE 1 MG/G
OINTMENT TOPICAL ONCE
OUTPATIENT
Start: 2024-10-17 | End: 2024-10-17

## 2024-10-17 RX ORDER — MUPIROCIN 20 MG/G
OINTMENT TOPICAL ONCE
OUTPATIENT
Start: 2024-10-17 | End: 2024-10-17

## 2024-10-17 RX ORDER — CLOBETASOL PROPIONATE 0.5 MG/G
OINTMENT TOPICAL ONCE
OUTPATIENT
Start: 2024-10-17 | End: 2024-10-17

## 2024-10-17 RX ORDER — SODIUM CHLOR/HYPOCHLOROUS ACID 0.033 %
SOLUTION, IRRIGATION IRRIGATION ONCE
OUTPATIENT
Start: 2024-10-17 | End: 2024-10-17

## 2024-10-17 RX ORDER — BETAMETHASONE DIPROPIONATE 0.5 MG/G
CREAM TOPICAL ONCE
OUTPATIENT
Start: 2024-10-17 | End: 2024-10-17

## 2024-10-17 RX ORDER — LISINOPRIL 2.5 MG/1
2.5 TABLET ORAL DAILY
COMMUNITY

## 2024-10-17 RX ORDER — BACITRACIN ZINC AND POLYMYXIN B SULFATE 500; 1000 [USP'U]/G; [USP'U]/G
OINTMENT TOPICAL ONCE
OUTPATIENT
Start: 2024-10-17 | End: 2024-10-17

## 2024-10-17 RX ORDER — SILVER SULFADIAZINE 10 MG/G
CREAM TOPICAL ONCE
OUTPATIENT
Start: 2024-10-17 | End: 2024-10-17

## 2024-10-17 RX ORDER — GINSENG 100 MG
CAPSULE ORAL ONCE
OUTPATIENT
Start: 2024-10-17 | End: 2024-10-17

## 2024-10-17 RX ORDER — NEOMYCIN/BACITRACIN/POLYMYXINB 3.5-400-5K
OINTMENT (GRAM) TOPICAL ONCE
OUTPATIENT
Start: 2024-10-17 | End: 2024-10-17

## 2024-10-17 ASSESSMENT — PAIN DESCRIPTION - DESCRIPTORS: DESCRIPTORS: SORE

## 2024-10-17 ASSESSMENT — PAIN DESCRIPTION - ORIENTATION: ORIENTATION: LEFT

## 2024-10-17 ASSESSMENT — PAIN SCALES - GENERAL: PAINLEVEL_OUTOF10: 5

## 2024-10-17 ASSESSMENT — PAIN DESCRIPTION - LOCATION: LOCATION: LEG

## 2024-10-17 NOTE — PATIENT INSTRUCTIONS
Discharge Instructions for  [facility]  [address] [address]  Phone: [phone] Fax: [phone]    NAME:  Lindy Beltran  YOB: 1990  MEDICAL RECORD NUMBER:  [id number]  DATE:  October 17, 2024    WOUND CARE ORDERS:  Wound - Cleanse with baby shampoo. Rinse and pat dry. Apply xerform to wound. Cover with silicone border foam. Apply double layer tubigrip size \"F\". Change dressing every other day.     We recommend wearing your compression stockings daily.      Activity:  [x] Elevate leg(s) above the level of the heart when sitting.  [x] Avoid prolonged standing in one place.   [x] Do no get dressing/wrap wet.       Dietary:  [x] Diet as tolerated      [] Diabetic Diet            [x] Increase Protein: examples (Meat, cheese, eggs, greek yogurt, fish, nuts)          [x] Sky Therapeutic Nutrition Powder  [] Other:       Return Appointment:  [x] Return Appointment: With Dr. Deja Barbour in 1 Week.  [] Nurse Visit :   [] Ordered tests:      Electronically signed Anastasia Rizzo RN on 10/17/2024 at 8:46 AM     Wound Care Center Information: Should you experience any significant changes in your wound(s) or have questions about your wound care, please contact the [facility] at MONDAY - FRIDAY 8:00 am - 4:30.  If you need help with your wound outside these hours and cannot wait until we are again available, contact your PCP or go to the hospital emergency room.     PLEASE NOTE: IF YOU ARE UNABLE TO OBTAIN WOUND SUPPLIES, CONTINUE TO USE THE SUPPLIES YOU HAVE AVAILABLE UNTIL YOU ARE ABLE TO REACH US. IT IS MOST IMPORTANT TO KEEP THE WOUND COVERED AT ALL TIMES.     Physician Signature:_______________________    Date: ___________ Time:  ____________

## 2024-10-17 NOTE — FLOWSHEET NOTE
10/17/24 0921   Left Leg Edema Point of Measurement   Compression Therapy Tubular elastic support bandage   Tubular Elastic Support Bandage Compression Pressure Medium   Wound 10/17/24 Pretibial Left   Date First Assessed/Time First Assessed: 10/17/24 0856   Wound Approximate Age at First Assessment (Weeks): 1 weeks  Location: Pretibial  Wound Location Orientation: Left   Dressing Status New dressing applied   Dressing/Treatment Xeroform;Foam

## 2024-10-17 NOTE — FLOWSHEET NOTE
10/17/24 0856   Anesthetic   Anesthetic 4% Lidocaine Liquid Topical   Left Leg Edema Point of Measurement   Leg circumference 43.5 cm   Ankle circumference 25 cm   Peripheral Vascular   Edema Left lower extremity   LLE Edema +1;Non-pitting   LLE Neurovascular Assessment   Capillary Refill Less than/Equal to 3 seconds   Color Appropriate for Ethnicity   Temperature Warm   LLE Sensation  Full sensation   L Pedal Pulse +3   Wound 10/17/24 Pretibial Left   Date First Assessed/Time First Assessed: 10/17/24 0856   Wound Approximate Age at First Assessment (Weeks): 1 weeks  Location: Pretibial  Wound Location Orientation: Left   Wound Image    Wound Etiology Traumatic   Dressing Status Old drainage noted   Wound Cleansed Cleansed with saline   Offloading for Diabetic Foot Ulcers Offloading not required   Wound Length (cm) 3.7 cm   Wound Width (cm) 3.9 cm   Wound Depth (cm) 0.1 cm   Wound Surface Area (cm^2) 14.43 cm^2   Wound Volume (cm^3) 1.443 cm^3   Wound Assessment Pink/red;Slough;Devitalized tissue;Bleeding   Drainage Amount Moderate (25-50%)   Drainage Description Serosanguinous   Odor None   Glenys-wound Assessment Hyperpigmented;Intact;Blanchable erythema   Margins Flat/open edges;Undefined edges   Wound Thickness Description not for Pressure Injury Full thickness     There were no vitals taken for this visit.  BP (!) 139/100   Pulse 94   Temp 97.8 °F (36.6 °C) (Temporal)   Resp 20

## 2024-10-24 ENCOUNTER — HOSPITAL ENCOUNTER (OUTPATIENT)
Facility: HOSPITAL | Age: 34
Discharge: HOME | End: 2024-10-24
Attending: RADIOLOGY
Payer: MEDICAID

## 2024-10-24 VITALS
DIASTOLIC BLOOD PRESSURE: 107 MMHG | SYSTOLIC BLOOD PRESSURE: 143 MMHG | HEART RATE: 102 BPM | TEMPERATURE: 97.8 F | RESPIRATION RATE: 20 BRPM

## 2024-10-24 DIAGNOSIS — L97.822 NON-PRESSURE CHRONIC ULCER OF OTHER PART OF LEFT LOWER LEG WITH FAT LAYER EXPOSED (HCC): Primary | ICD-10-CM

## 2024-10-24 PROCEDURE — 11042 DBRDMT SUBQ TIS 1ST 20SQCM/<: CPT

## 2024-10-24 RX ORDER — GINSENG 100 MG
CAPSULE ORAL ONCE
OUTPATIENT
Start: 2024-10-24 | End: 2024-10-24

## 2024-10-24 RX ORDER — MUPIROCIN 20 MG/G
OINTMENT TOPICAL ONCE
OUTPATIENT
Start: 2024-10-24 | End: 2024-10-24

## 2024-10-24 RX ORDER — SILVER SULFADIAZINE 10 MG/G
CREAM TOPICAL ONCE
OUTPATIENT
Start: 2024-10-24 | End: 2024-10-24

## 2024-10-24 RX ORDER — SODIUM CHLOR/HYPOCHLOROUS ACID 0.033 %
SOLUTION, IRRIGATION IRRIGATION ONCE
OUTPATIENT
Start: 2024-10-24 | End: 2024-10-24

## 2024-10-24 RX ORDER — TRIAMCINOLONE ACETONIDE 1 MG/G
OINTMENT TOPICAL ONCE
OUTPATIENT
Start: 2024-10-24 | End: 2024-10-24

## 2024-10-24 RX ORDER — BACITRACIN ZINC AND POLYMYXIN B SULFATE 500; 1000 [USP'U]/G; [USP'U]/G
OINTMENT TOPICAL ONCE
OUTPATIENT
Start: 2024-10-24 | End: 2024-10-24

## 2024-10-24 RX ORDER — BETAMETHASONE DIPROPIONATE 0.5 MG/G
CREAM TOPICAL ONCE
OUTPATIENT
Start: 2024-10-24 | End: 2024-10-24

## 2024-10-24 RX ORDER — NEOMYCIN/BACITRACIN/POLYMYXINB 3.5-400-5K
OINTMENT (GRAM) TOPICAL ONCE
OUTPATIENT
Start: 2024-10-24 | End: 2024-10-24

## 2024-10-24 RX ORDER — CLOBETASOL PROPIONATE 0.5 MG/G
OINTMENT TOPICAL ONCE
OUTPATIENT
Start: 2024-10-24 | End: 2024-10-24

## 2024-10-24 RX ORDER — GENTAMICIN SULFATE 1 MG/G
OINTMENT TOPICAL ONCE
OUTPATIENT
Start: 2024-10-24 | End: 2024-10-24

## 2024-10-24 NOTE — PATIENT INSTRUCTIONS
Discharge Instructions for  [facility]  [address] [address]  Phone: [phone] Fax: [phone]    NAME:  Lindy Beltran  YOB: 1990  MEDICAL RECORD NUMBER:  [id number]  DATE:  October 24, 2024    WOUND CARE ORDERS:  Wound - Cleanse with baby shampoo. Rinse and pat dry. Apply xerform to wound. Cover with silicone border foam. Apply double layer tubigrip size \"F\". Change dressing every other day.     We recommend wearing your compression stockings daily.      Activity:  [x] Elevate leg(s) above the level of the heart when sitting.  [x] Avoid prolonged standing in one place.   [x] Do no get dressing/wrap wet.       Dietary:  [x] Diet as tolerated      [] Diabetic Diet            [x] Increase Protein: examples (Meat, cheese, eggs, greek yogurt, fish, nuts)          [x] Sky Therapeutic Nutrition Powder  [] Other:       Return Appointment:  [x] Return Appointment: With Dr. Deja Barbour in 1 Week.  [] Nurse Visit :   [] Ordered tests:      Electronically signed KAMINI POSEY RN on 10/24/2024 at 9:52 AM     Wound Care Center Information: Should you experience any significant changes in your wound(s) or have questions about your wound care, please contact the [facility] at MONDAY - FRIDAY 8:00 am - 4:30.  If you need help with your wound outside these hours and cannot wait until we are again available, contact your PCP or go to the hospital emergency room.     PLEASE NOTE: IF YOU ARE UNABLE TO OBTAIN WOUND SUPPLIES, CONTINUE TO USE THE SUPPLIES YOU HAVE AVAILABLE UNTIL YOU ARE ABLE TO REACH US. IT IS MOST IMPORTANT TO KEEP THE WOUND COVERED AT ALL TIMES.     Physician Signature:_______________________    Date: ___________ Time:  ____________

## 2024-10-24 NOTE — FLOWSHEET NOTE
10/24/24 0932   Anesthetic   Anesthetic 4% Lidocaine Liquid Topical   Left Leg Edema Point of Measurement   Leg circumference 42 cm   Ankle circumference 25 cm   Compression Therapy Tubular elastic support bandage   Tubular Elastic Support Bandage Compression Pressure Medium   Peripheral Vascular   Edema Left lower extremity   LLE Edema +1;Non-pitting   LLE Neurovascular Assessment   Capillary Refill Less than/Equal to 3 seconds   Color Appropriate for Ethnicity   Temperature Warm   LLE Sensation  Full sensation   L Pedal Pulse +2   Wound 10/17/24 Pretibial Left   Date First Assessed/Time First Assessed: 10/17/24 0856   Wound Approximate Age at First Assessment (Weeks): 1 weeks  Location: Pretibial  Wound Location Orientation: Left   Wound Image    Wound Etiology Traumatic   Dressing Status Old drainage noted   Wound Cleansed Cleansed with saline   Offloading for Diabetic Foot Ulcers Offloading not required   Wound Length (cm) 3 cm   Wound Width (cm) 3.4 cm   Wound Depth (cm) 0.2 cm   Wound Surface Area (cm^2) 10.2 cm^2   Change in Wound Size % (l*w) 29.31   Wound Volume (cm^3) 2.04 cm^3   Wound Healing % -41   Wound Assessment Pink/red;Purple/maroon   Drainage Amount Moderate (25-50%)   Drainage Description Serosanguinous   Odor None   Glenys-wound Assessment Maceration   Margins Unattached edges;Defined edges   Wound Thickness Description not for Pressure Injury Full thickness   Pain Assessment   Pain Assessment None - Denies Pain     BP (!) 143/107   Pulse (!) 102   Temp 97.8 °F (36.6 °C) (Temporal)   Resp 20

## 2024-10-24 NOTE — FLOWSHEET NOTE
10/24/24 1006   Left Leg Edema Point of Measurement   Compression Therapy Tubular elastic support bandage   Tubular Elastic Support Bandage Compression Pressure Medium   Wound 10/17/24 Pretibial Left   Date First Assessed/Time First Assessed: 10/17/24 0856   Wound Approximate Age at First Assessment (Weeks): 1 weeks  Location: Pretibial  Wound Location Orientation: Left   Dressing Status New dressing applied   Dressing/Treatment Xeroform;Foam

## 2024-10-24 NOTE — PROGRESS NOTES
[facility]   W[facility] and [facility]   Medical Staff Progress Note     Lnidy Beltran  MEDICAL RECORD NUMBER:  [id number]  AGE: 34 y.o.   GENDER: female  : 1990  EPISODE DATE:  10/24/2024    Chief complaint and reason for visit:     Chief Complaint   Patient presents for follow up of her new left lower leg wound secondary to trauma    Follow-up     \"Leg wound\"      Patient presenting for follow up evaluation of wound(s) per chief complaint.      Subjective and ROS: Ms Lindy Beltran is a 35 yo female with a history of uncontrolled HTN, venous insufficiency, presenting with a left lower leg wound that occurred after hitting a stone wall a week ago.  Claims to be accident prone  with history of hitting her lower leg in the past.  The area blistered initially, went to [facility], given antibiotics, Cephalexin but notes the wound has not improved. She comes to [facility] today for management.     Patient claims sharp pain of 6/10 since, sensitive, drainage, red but notes no fever, chills.  She does not smoke. Does not have DM.  Is noncompliant with her antihypertensive medication as she did not take today.  Has headaches. No visual issues, chest pains, palpitations, SOB or neurological symptoms.  Has compression stockings for her venous insufficiency but wears occasionally.      2024-  On follow up, Ms Beltran returns with no complaints.  Still with pain but slightly less, 5/10.  Claims she is now taking her antihypertensive medication regularly. BP today is 143/103.  Her regular GYN has been managing her BP but he recently left the practice and does not have a PCP.    Has no fever or chills.  Still with mild headache    History of Wound Context: Per original history and physical on this patient. Changes in history since last evaluation: none    Medical Decision Making:     Problem List Items Addressed This Visit          Other    Non-pressure chronic ulcer of

## 2024-10-31 ENCOUNTER — HOSPITAL ENCOUNTER (OUTPATIENT)
Facility: HOSPITAL | Age: 34
Discharge: HOME | End: 2024-10-31
Attending: RADIOLOGY
Payer: MEDICAID

## 2024-10-31 VITALS
RESPIRATION RATE: 18 BRPM | SYSTOLIC BLOOD PRESSURE: 120 MMHG | TEMPERATURE: 97.8 F | DIASTOLIC BLOOD PRESSURE: 86 MMHG | HEART RATE: 92 BPM

## 2024-10-31 DIAGNOSIS — L97.822 NON-PRESSURE CHRONIC ULCER OF OTHER PART OF LEFT LOWER LEG WITH FAT LAYER EXPOSED (HCC): Primary | ICD-10-CM

## 2024-10-31 PROCEDURE — 11042 DBRDMT SUBQ TIS 1ST 20SQCM/<: CPT

## 2024-10-31 RX ORDER — MUPIROCIN 20 MG/G
OINTMENT TOPICAL ONCE
OUTPATIENT
Start: 2024-10-31 | End: 2024-10-31

## 2024-10-31 RX ORDER — GENTAMICIN SULFATE 1 MG/G
OINTMENT TOPICAL ONCE
OUTPATIENT
Start: 2024-10-31 | End: 2024-10-31

## 2024-10-31 RX ORDER — SODIUM CHLOR/HYPOCHLOROUS ACID 0.033 %
SOLUTION, IRRIGATION IRRIGATION ONCE
OUTPATIENT
Start: 2024-10-31 | End: 2024-10-31

## 2024-10-31 RX ORDER — CLOBETASOL PROPIONATE 0.5 MG/G
OINTMENT TOPICAL ONCE
OUTPATIENT
Start: 2024-10-31 | End: 2024-10-31

## 2024-10-31 RX ORDER — TRIAMCINOLONE ACETONIDE 1 MG/G
OINTMENT TOPICAL ONCE
OUTPATIENT
Start: 2024-10-31 | End: 2024-10-31

## 2024-10-31 RX ORDER — SILVER SULFADIAZINE 10 MG/G
CREAM TOPICAL ONCE
OUTPATIENT
Start: 2024-10-31 | End: 2024-10-31

## 2024-10-31 RX ORDER — NEOMYCIN/BACITRACIN/POLYMYXINB 3.5-400-5K
OINTMENT (GRAM) TOPICAL ONCE
OUTPATIENT
Start: 2024-10-31 | End: 2024-10-31

## 2024-10-31 RX ORDER — GINSENG 100 MG
CAPSULE ORAL ONCE
OUTPATIENT
Start: 2024-10-31 | End: 2024-10-31

## 2024-10-31 RX ORDER — BACITRACIN ZINC AND POLYMYXIN B SULFATE 500; 1000 [USP'U]/G; [USP'U]/G
OINTMENT TOPICAL ONCE
OUTPATIENT
Start: 2024-10-31 | End: 2024-10-31

## 2024-10-31 RX ORDER — BETAMETHASONE DIPROPIONATE 0.5 MG/G
CREAM TOPICAL ONCE
OUTPATIENT
Start: 2024-10-31 | End: 2024-10-31

## 2024-10-31 NOTE — PATIENT INSTRUCTIONS
Discharge Instructions for  [facility]  [address] [address]  Phone: [phone] Fax: [phone]    NAME:  Lindy Beltran  YOB: 1990  MEDICAL RECORD NUMBER:  [id number]  DATE:  October 31, 2024    WOUND CARE ORDERS:  Wound - Cleanse with baby shampoo. Rinse and pat dry. Apply medihoney gel to wound. Cover with silicone border foam. Apply double layer tubigrip size \"F\". Change dressing every other day.     We recommend wearing your compression stockings daily.    Activity:  [x] Elevate leg(s) above the level of the heart when sitting.  [x] Avoid prolonged standing in one place.   [x] Do no get dressing/wrap wet.       Dietary:  [x] Diet as tolerated      [] Diabetic Diet            [x] Increase Protein: examples (Meat, cheese, eggs, greek yogurt, fish, nuts)          [x] Sky Therapeutic Nutrition Powder  [] Other:       Return Appointment:  [x] Return Appointment: With Dr. Deja Barbour in 1 Week.  [] Nurse Visit :   [] Ordered tests:      Electronically signed by Natalio Fna RN on 10/31/2024 at 9:40 AM     Wound Care Center Information: Should you experience any significant changes in your wound(s) or have questions about your wound care, please contact the [facility] at MONDAY - FRIDAY 8:00 am - 4:30.  If you need help with your wound outside these hours and cannot wait until we are again available, contact your PCP or go to the hospital emergency room.     PLEASE NOTE: IF YOU ARE UNABLE TO OBTAIN WOUND SUPPLIES, CONTINUE TO USE THE SUPPLIES YOU HAVE AVAILABLE UNTIL YOU ARE ABLE TO REACH US. IT IS MOST IMPORTANT TO KEEP THE WOUND COVERED AT ALL TIMES.     Physician Signature:_______________________    Date: ___________ Time:  ____________

## 2024-10-31 NOTE — FLOWSHEET NOTE
10/31/24 1002   Left Leg Edema Point of Measurement   Compression Therapy Tubular elastic support bandage   Tubular Elastic Support Bandage Compression Pressure Medium   Wound 10/17/24 Pretibial Left   Date First Assessed/Time First Assessed: 10/17/24 0856   Wound Approximate Age at First Assessment (Weeks): 1 weeks  Location: Pretibial  Wound Location Orientation: Left   Dressing Status New dressing applied   Dressing/Treatment Hydrating gel;Foam

## 2024-10-31 NOTE — FLOWSHEET NOTE
10/31/24 0928   Anesthetic   Anesthetic 4% Lidocaine Liquid Topical   Left Leg Edema Point of Measurement   Leg circumference 42 cm   Ankle circumference 25 cm   Compression Therapy Tubular elastic support bandage   Tubular Elastic Support Bandage Compression Pressure Medium   Peripheral Vascular   LLE Edema +1;Non-pitting   LLE Neurovascular Assessment   Capillary Refill Less than/Equal to 3 seconds   Color Appropriate for Ethnicity   Temperature Warm   LLE Sensation  Full sensation   L Pedal Pulse +2   Wound 10/17/24 Pretibial Left   Date First Assessed/Time First Assessed: 10/17/24 0856   Wound Approximate Age at First Assessment (Weeks): 1 weeks  Location: Pretibial  Wound Location Orientation: Left   Wound Image    Wound Etiology Traumatic   Dressing Status Old drainage noted   Wound Cleansed Cleansed with saline   Offloading for Diabetic Foot Ulcers Offloading not required   Wound Length (cm) 4.1 cm   Wound Width (cm) 3.4 cm   Wound Depth (cm) 0.2 cm   Wound Surface Area (cm^2) 13.94 cm^2   Change in Wound Size % (l*w) 3.4   Wound Volume (cm^3) 2.788 cm^3   Wound Healing % -93   Wound Assessment Pink/red;Slough;Devitalized tissue   Drainage Amount Moderate (25-50%)   Drainage Description Serosanguinous   Odor None   Glenys-wound Assessment Dry/flaky   Margins Defined edges   Wound Thickness Description not for Pressure Injury Full thickness   Pain Assessment   Pain Assessment None - Denies Pain     /86   Pulse 92   Temp 97.8 °F (36.6 °C) (Temporal)   Resp 18

## 2024-11-14 ENCOUNTER — HOSPITAL ENCOUNTER (OUTPATIENT)
Facility: HOSPITAL | Age: 34
Discharge: HOME | End: 2024-11-14
Attending: RADIOLOGY
Payer: MEDICAID

## 2024-11-14 VITALS
TEMPERATURE: 97.6 F | RESPIRATION RATE: 18 BRPM | DIASTOLIC BLOOD PRESSURE: 95 MMHG | HEART RATE: 103 BPM | SYSTOLIC BLOOD PRESSURE: 134 MMHG

## 2024-11-14 DIAGNOSIS — L97.822 NON-PRESSURE CHRONIC ULCER OF OTHER PART OF LEFT LOWER LEG WITH FAT LAYER EXPOSED (HCC): Primary | ICD-10-CM

## 2024-11-14 PROCEDURE — 99213 OFFICE O/P EST LOW 20 MIN: CPT

## 2024-11-14 PROCEDURE — 99212 OFFICE O/P EST SF 10 MIN: CPT | Performed by: SURGERY

## 2024-11-14 RX ORDER — NEOMYCIN/BACITRACIN/POLYMYXINB 3.5-400-5K
OINTMENT (GRAM) TOPICAL ONCE
OUTPATIENT
Start: 2024-11-14 | End: 2024-11-14

## 2024-11-14 RX ORDER — MUPIROCIN 20 MG/G
OINTMENT TOPICAL ONCE
OUTPATIENT
Start: 2024-11-14 | End: 2024-11-14

## 2024-11-14 RX ORDER — BACITRACIN ZINC AND POLYMYXIN B SULFATE 500; 1000 [USP'U]/G; [USP'U]/G
OINTMENT TOPICAL ONCE
OUTPATIENT
Start: 2024-11-14 | End: 2024-11-14

## 2024-11-14 RX ORDER — SODIUM CHLOR/HYPOCHLOROUS ACID 0.033 %
SOLUTION, IRRIGATION IRRIGATION ONCE
OUTPATIENT
Start: 2024-11-14 | End: 2024-11-14

## 2024-11-14 RX ORDER — SILVER SULFADIAZINE 10 MG/G
CREAM TOPICAL ONCE
OUTPATIENT
Start: 2024-11-14 | End: 2024-11-14

## 2024-11-14 RX ORDER — BETAMETHASONE DIPROPIONATE 0.5 MG/G
CREAM TOPICAL ONCE
OUTPATIENT
Start: 2024-11-14 | End: 2024-11-14

## 2024-11-14 RX ORDER — GINSENG 100 MG
CAPSULE ORAL ONCE
OUTPATIENT
Start: 2024-11-14 | End: 2024-11-14

## 2024-11-14 RX ORDER — TRIAMCINOLONE ACETONIDE 1 MG/G
OINTMENT TOPICAL ONCE
OUTPATIENT
Start: 2024-11-14 | End: 2024-11-14

## 2024-11-14 RX ORDER — CLOBETASOL PROPIONATE 0.5 MG/G
OINTMENT TOPICAL ONCE
OUTPATIENT
Start: 2024-11-14 | End: 2024-11-14

## 2024-11-14 RX ORDER — GENTAMICIN SULFATE 1 MG/G
OINTMENT TOPICAL ONCE
OUTPATIENT
Start: 2024-11-14 | End: 2024-11-14

## 2024-11-14 NOTE — FLOWSHEET NOTE
11/14/24 0928   Anesthetic   Anesthetic 4% Lidocaine Liquid Topical   Left Leg Edema Point of Measurement   Leg circumference 42.5 cm   Ankle circumference 25.5 cm   Compression Therapy Other  (not wearing)   Peripheral Vascular   LLE Edema +1;Non-pitting   LLE Neurovascular Assessment   Capillary Refill Less than/Equal to 3 seconds   Color Appropriate for Ethnicity   Temperature Warm   LLE Sensation  Full sensation   L Pedal Pulse +2   Wound 10/17/24 Pretibial Left   Date First Assessed/Time First Assessed: 10/17/24 0856   Wound Approximate Age at First Assessment (Weeks): 1 weeks  Location: Pretibial  Wound Location Orientation: Left   Wound Image    Wound Etiology Traumatic   Dressing Status Old drainage noted   Wound Cleansed Cleansed with saline   Offloading for Diabetic Foot Ulcers Offloading not required   Wound Length (cm) 3.1 cm   Wound Width (cm) 3.2 cm   Wound Depth (cm) 0.2 cm   Wound Surface Area (cm^2) 9.92 cm^2   Change in Wound Size % (l*w) 31.25   Wound Volume (cm^3) 1.984 cm^3   Wound Healing % -37   Wound Assessment [place]/red;Slough   Drainage Amount Moderate (25-50%)   Drainage Description Serosanguinous   Odor None   Glenys-wound Assessment Hyperpigmented;Maceration   Margins Defined edges   Wound Thickness Description not for Pressure Injury Full thickness     BP (!) 134/95   Pulse (!) 103   Temp 97.6 °F (36.4 °C) (Temporal)   Resp 18

## 2024-11-14 NOTE — PROGRESS NOTES
Left   Dressing Status New dressing applied   Dressing/Treatment Xeroform;Foam        Follow-up November 14, 2024: Patient doing well, no complications, minimal drainage, patient says she was using Medihoney on the dressings previously as well.  Patient has compression stockings at home admits she is not always using these.    Review of Systems   Constitutional:         See HPI   All other systems reviewed and are negative.      Objective:     There were no vitals taken for this visit.    No data recorded.      Physical Exam  Vitals and nursing note reviewed. Exam conducted with a chaperone present.   Constitutional:       General: She is not in acute distress.     Appearance: Normal appearance. She is not ill-appearing.   HENT:      Head: Normocephalic and atraumatic.   Eyes:      Conjunctiva/sclera: Conjunctivae normal.   Cardiovascular:      Rate and Rhythm: Normal rate.   Pulmonary:      Effort: Pulmonary effort is normal.   Neurological:      General: No focal deficit present.      Mental Status: She is alert and oriented to person, place, and time.   Psychiatric:         Mood and Affect: Mood normal.         Behavior: Behavior normal.         Thought Content: Thought content normal.         Judgment: Judgment normal.     Left leg wound clean healing well granulating with evidence of shiny new epithelium very thin around the wound and on the wound.    11/14/24 0928    Anesthetic   Anesthetic 4% Lidocaine Liquid Topical   Left Leg Edema Point of Measurement   Leg circumference 42.5 cm   Ankle circumference 25.5 cm   Compression Therapy Other  (not wearing)   Peripheral Vascular   LLE Edema +1;Non-pitting   LLE Neurovascular Assessment   Capillary Refill Less than/Equal to 3 seconds   Color Appropriate for Ethnicity   Temperature Warm   LLE Sensation  Full sensation   L Pedal Pulse +2   Wound 10/17/24 Pretibial Left   Date First Assessed/Time First Assessed: 10/17/24 0856   Wound Approximate Age at First

## 2024-11-14 NOTE — FLOWSHEET NOTE
11/14/24 1045   Left Leg Edema Point of Measurement   Compression Therapy Tubular elastic support bandage   Tubular Elastic Support Bandage Compression Pressure Medium   Wound 10/17/24 Pretibial Left   Date First Assessed/Time First Assessed: 10/17/24 0856   Wound Approximate Age at First Assessment (Weeks): 1 weeks  Location: Pretibial  Wound Location Orientation: Left   Dressing Status New dressing applied   Dressing/Treatment   (xeroform, border foam, double tubi F)   Offloading for Diabetic Foot Ulcers Offloading not required

## 2024-11-14 NOTE — PATIENT INSTRUCTIONS
Discharge Instructions for  [facility]  [address] [address]  Phone: [phone] Fax: [phone]    NAME:  Lindy Beltran  YOB: 1990  MEDICAL RECORD NUMBER:  [id number]  DATE:  November 14, 2024    WOUND CARE ORDERS:  Wound - Cleanse with baby shampoo. Rinse and pat dry. Apply xeroform to wound. Cover with silicone border foam. Apply double layer tubigrip size \"F\". Change dressing every other day.     We recommend wearing your compression stockings (15-20mmHg) daily.    Activity:  [x] Elevate leg(s) above the level of the heart when sitting.  [x] Avoid prolonged standing in one place.   [x] Do no get dressing/wrap wet.       Dietary:  [x] Diet as tolerated      [] Diabetic Diet            [x] Increase Protein: examples (Meat, cheese, eggs, greek yogurt, fish, nuts)          [x] Sky Therapeutic Nutrition Powder  [] Other:       Return Appointment:  [x] Return Appointment: With Dr. Deja Barbour in 2 Week.  [] Nurse Visit :   [] Ordered tests:          Wound Care Center Information: Should you experience any significant changes in your wound(s) or have questions about your wound care, please contact the [facility] at MONDAY - FRIDAY 8:00 am - 4:30.  If you need help with your wound outside these hours and cannot wait until we are again available, contact your PCP or go to the hospital emergency room.     PLEASE NOTE: IF YOU ARE UNABLE TO OBTAIN WOUND SUPPLIES, CONTINUE TO USE THE SUPPLIES YOU HAVE AVAILABLE UNTIL YOU ARE ABLE TO REACH US. IT IS MOST IMPORTANT TO KEEP THE WOUND COVERED AT ALL TIMES.     Physician Signature:_______________________    Date: ___________ Time:  ____________

## 2024-12-02 ENCOUNTER — HOSPITAL ENCOUNTER (OUTPATIENT)
Facility: HOSPITAL | Age: 34
Discharge: HOME | End: 2024-12-02
Attending: RADIOLOGY
Payer: MEDICAID

## 2024-12-02 VITALS
TEMPERATURE: 97.4 F | RESPIRATION RATE: 18 BRPM | HEART RATE: 96 BPM | SYSTOLIC BLOOD PRESSURE: 152 MMHG | DIASTOLIC BLOOD PRESSURE: 117 MMHG

## 2024-12-02 DIAGNOSIS — L97.822 NON-PRESSURE CHRONIC ULCER OF OTHER PART OF LEFT LOWER LEG WITH FAT LAYER EXPOSED (HCC): Primary | ICD-10-CM

## 2024-12-02 PROBLEM — I10 HYPERTENSION: Status: ACTIVE | Noted: 2024-12-02

## 2024-12-02 PROCEDURE — 11042 DBRDMT SUBQ TIS 1ST 20SQCM/<: CPT

## 2024-12-02 RX ORDER — BETAMETHASONE DIPROPIONATE 0.5 MG/G
CREAM TOPICAL ONCE
OUTPATIENT
Start: 2024-12-02 | End: 2024-12-02

## 2024-12-02 RX ORDER — BACITRACIN ZINC AND POLYMYXIN B SULFATE 500; 1000 [USP'U]/G; [USP'U]/G
OINTMENT TOPICAL ONCE
OUTPATIENT
Start: 2024-12-02 | End: 2024-12-02

## 2024-12-02 RX ORDER — TRIAMCINOLONE ACETONIDE 1 MG/G
OINTMENT TOPICAL ONCE
OUTPATIENT
Start: 2024-12-02 | End: 2024-12-02

## 2024-12-02 RX ORDER — SILVER SULFADIAZINE 10 MG/G
CREAM TOPICAL ONCE
OUTPATIENT
Start: 2024-12-02 | End: 2024-12-02

## 2024-12-02 RX ORDER — GINSENG 100 MG
CAPSULE ORAL ONCE
OUTPATIENT
Start: 2024-12-02 | End: 2024-12-02

## 2024-12-02 RX ORDER — NEOMYCIN/BACITRACIN/POLYMYXINB 3.5-400-5K
OINTMENT (GRAM) TOPICAL ONCE
OUTPATIENT
Start: 2024-12-02 | End: 2024-12-02

## 2024-12-02 RX ORDER — GENTAMICIN SULFATE 1 MG/G
OINTMENT TOPICAL ONCE
OUTPATIENT
Start: 2024-12-02 | End: 2024-12-02

## 2024-12-02 RX ORDER — CLOBETASOL PROPIONATE 0.5 MG/G
OINTMENT TOPICAL ONCE
OUTPATIENT
Start: 2024-12-02 | End: 2024-12-02

## 2024-12-02 RX ORDER — MUPIROCIN 20 MG/G
OINTMENT TOPICAL ONCE
OUTPATIENT
Start: 2024-12-02 | End: 2024-12-02

## 2024-12-02 RX ORDER — SODIUM CHLOR/HYPOCHLOROUS ACID 0.033 %
SOLUTION, IRRIGATION IRRIGATION ONCE
OUTPATIENT
Start: 2024-12-02 | End: 2024-12-02

## 2024-12-02 NOTE — FLOWSHEET NOTE
12/02/24 0935   Left Leg Edema Point of Measurement   Compression Therapy Tubular elastic support bandage   Tubular Elastic Support Bandage Compression Pressure Medium   Wound 10/17/24 Pretibial Left   Date First Assessed/Time First Assessed: 10/17/24 0856   Wound Approximate Age at First Assessment (Weeks): 1 weeks  Location: Pretibial  Wound Location Orientation: Left   Dressing Status New dressing applied   Dressing/Treatment Xeroform;Silicone border;Tubular bandage     MD aware of bp taken

## 2024-12-02 NOTE — FLOWSHEET NOTE
12/02/24 0915   Anesthetic   Anesthetic 4% Lidocaine Cream   Left Leg Edema Point of Measurement   Leg circumference 43 cm   Ankle circumference 24.5 cm   Compression Therapy Tubular elastic support bandage   Tubular Elastic Support Bandage Compression Pressure Medium   Peripheral Vascular   LLE Edema +1;Non-pitting   LLE Neurovascular Assessment   Capillary Refill Less than/Equal to 3 seconds   Color Appropriate for Ethnicity   Temperature Warm   LLE Sensation  Full sensation   L Pedal Pulse +2   Wound 10/17/24 Pretibial Left   Date First Assessed/Time First Assessed: 10/17/24 0856   Wound Approximate Age at First Assessment (Weeks): 1 weeks  Location: Pretibial  Wound Location Orientation: Left   Wound Image    Wound Etiology Traumatic   Dressing Status Intact;Old drainage noted   Wound Cleansed Cleansed with saline   Offloading for Diabetic Foot Ulcers Offloading not required   Wound Length (cm) 2 cm   Wound Width (cm) 1.1 cm   Wound Depth (cm) 0.2 cm   Wound Surface Area (cm^2) 2.2 cm^2   Change in Wound Size % (l*w) 84.75   Wound Volume (cm^3) 0.44 cm^3   Wound Healing % 70   Wound Assessment [place]/red;Slough   Drainage Amount Moderate (25-50%)   Drainage Description Serosanguinous   Odor None   Glenys-wound Assessment Maceration;Hypopigmented   Margins Defined edges   Wound Thickness Description not for Pressure Injury Full thickness   Pain Assessment   Pain Assessment None - Denies Pain     BP (!) 163/121   Pulse 96   Temp 97.4 °F (36.3 °C) (Temporal)   Resp 18

## 2024-12-02 NOTE — PATIENT INSTRUCTIONS
Discharge Instructions for  [facility]  [address] [address]  Phone: [phone] Fax: [phone]    NAME:  Lindy Beltran  YOB: 1990  MEDICAL RECORD NUMBER:  [id number]  DATE:  December 2, 2024    WOUND CARE ORDERS:  Wound - Cleanse with baby shampoo. Rinse and pat dry. Apply xeroform to wound. Cover with silicone border foam. Apply double layer tubigrip size \"F\". Change dressing every other day.     We recommend wearing your compression stockings (15-20mmHg) daily.    Activity:  [x] Elevate leg(s) above the level of the heart when sitting.  [x] Avoid prolonged standing in one place.   [x] Do no get dressing/wrap wet.       Dietary:  [x] Diet as tolerated      [] Diabetic Diet            [x] Increase Protein: examples (Meat, cheese, eggs, greek yogurt, fish, nuts)          [x] Sky Therapeutic Nutrition Powder  [] Other:       Return Appointment:  [x] Return Appointment: With Dr. Deja Barbour in 2 weeks.   [] Nurse Visit :   [] Ordered tests:          Wound Care Center Information: Should you experience any significant changes in your wound(s) or have questions about your wound care, please contact the [facility] at MONDAY - FRIDAY 8:00 am - 4:30.  If you need help with your wound outside these hours and cannot wait until we are again available, contact your PCP or go to the hospital emergency room.     PLEASE NOTE: IF YOU ARE UNABLE TO OBTAIN WOUND SUPPLIES, CONTINUE TO USE THE SUPPLIES YOU HAVE AVAILABLE UNTIL YOU ARE ABLE TO REACH US. IT IS MOST IMPORTANT TO KEEP THE WOUND COVERED AT ALL TIMES.     Physician Signature:_______________________    Date: ___________ Time:  ____________

## 2024-12-16 ENCOUNTER — HOSPITAL ENCOUNTER (OUTPATIENT)
Facility: HOSPITAL | Age: 34
Discharge: HOME | End: 2024-12-16
Attending: RADIOLOGY
Payer: MEDICAID

## 2024-12-16 VITALS
HEART RATE: 90 BPM | SYSTOLIC BLOOD PRESSURE: 150 MMHG | TEMPERATURE: 97.1 F | RESPIRATION RATE: 18 BRPM | DIASTOLIC BLOOD PRESSURE: 90 MMHG

## 2024-12-16 DIAGNOSIS — L97.822 NON-PRESSURE CHRONIC ULCER OF OTHER PART OF LEFT LOWER LEG WITH FAT LAYER EXPOSED (HCC): Primary | ICD-10-CM

## 2024-12-16 PROCEDURE — 11042 DBRDMT SUBQ TIS 1ST 20SQCM/<: CPT

## 2024-12-16 RX ORDER — MUPIROCIN 20 MG/G
OINTMENT TOPICAL ONCE
OUTPATIENT
Start: 2024-12-16 | End: 2024-12-16

## 2024-12-16 RX ORDER — SILVER SULFADIAZINE 10 MG/G
CREAM TOPICAL ONCE
OUTPATIENT
Start: 2024-12-16 | End: 2024-12-16

## 2024-12-16 RX ORDER — GENTAMICIN SULFATE 1 MG/G
OINTMENT TOPICAL ONCE
OUTPATIENT
Start: 2024-12-16 | End: 2024-12-16

## 2024-12-16 RX ORDER — SODIUM CHLOR/HYPOCHLOROUS ACID 0.033 %
SOLUTION, IRRIGATION IRRIGATION ONCE
OUTPATIENT
Start: 2024-12-16 | End: 2024-12-16

## 2024-12-16 RX ORDER — CLOBETASOL PROPIONATE 0.5 MG/G
OINTMENT TOPICAL ONCE
OUTPATIENT
Start: 2024-12-16 | End: 2024-12-16

## 2024-12-16 RX ORDER — BETAMETHASONE DIPROPIONATE 0.5 MG/G
CREAM TOPICAL ONCE
OUTPATIENT
Start: 2024-12-16 | End: 2024-12-16

## 2024-12-16 RX ORDER — GINSENG 100 MG
CAPSULE ORAL ONCE
OUTPATIENT
Start: 2024-12-16 | End: 2024-12-16

## 2024-12-16 RX ORDER — TRIAMCINOLONE ACETONIDE 1 MG/G
OINTMENT TOPICAL ONCE
OUTPATIENT
Start: 2024-12-16 | End: 2024-12-16

## 2024-12-16 RX ORDER — NEOMYCIN/BACITRACIN/POLYMYXINB 3.5-400-5K
OINTMENT (GRAM) TOPICAL ONCE
OUTPATIENT
Start: 2024-12-16 | End: 2024-12-16

## 2024-12-16 RX ORDER — BACITRACIN ZINC AND POLYMYXIN B SULFATE 500; 1000 [USP'U]/G; [USP'U]/G
OINTMENT TOPICAL ONCE
OUTPATIENT
Start: 2024-12-16 | End: 2024-12-16

## 2024-12-16 NOTE — FLOWSHEET NOTE
12/16/24 1011   Left Leg Edema Point of Measurement   Compression Therapy Tubular elastic support bandage   Tubular Elastic Support Bandage Compression Pressure Medium   Wound 10/17/24 Pretibial Left   Date First Assessed/Time First Assessed: 10/17/24 0856   Wound Approximate Age at First Assessment (Weeks): 1 weeks  Location: Pretibial  Wound Location Orientation: Left   Dressing Status New dressing applied   Dressing/Treatment Xeroform;Silicone border;Tubular bandage

## 2024-12-16 NOTE — FLOWSHEET NOTE
12/16/24 0924   Anesthetic   Anesthetic 4% Lidocaine Liquid Topical   Left Leg Edema Point of Measurement   Leg circumference 42 cm   Ankle circumference 26 cm   Compression Therapy Tubular elastic support bandage   Tubular Elastic Support Bandage Compression Pressure Medium   Peripheral Vascular   LLE Edema +1;Non-pitting   LLE Neurovascular Assessment   Capillary Refill Less than/Equal to 3 seconds   Color Appropriate for Ethnicity   Temperature Warm   LLE Sensation  Full sensation   L Pedal Pulse +2   Wound 10/17/24 Pretibial Left   Date First Assessed/Time First Assessed: 10/17/24 0856   Wound Approximate Age at First Assessment (Weeks): 1 weeks  Location: Pretibial  Wound Location Orientation: Left   Wound Image    Wound Etiology Traumatic   Dressing Status Old drainage noted   Wound Cleansed Cleansed with saline   Offloading for Diabetic Foot Ulcers Offloading not required   Wound Length (cm) 1 cm   Wound Width (cm) 0.5 cm   Wound Depth (cm) 0.2 cm   Wound Surface Area (cm^2) 0.5 cm^2   Change in Wound Size % (l*w) 96.53   Wound Volume (cm^3) 0.1 cm^3   Wound Healing % 93   Wound Assessment [place]/red;Slough   Drainage Amount Moderate (25-50%)   Drainage Description Serosanguinous   Odor None   Glenys-wound Assessment Hypopigmented;Hyperpigmented;Dry/flaky   Margins Defined edges   Wound Thickness Description not for Pressure Injury Full thickness   Pain Assessment   Pain Assessment None - Denies Pain   Patient's Stated Pain Goal 0 - No pain     BP (!) 150/90   Pulse 90   Temp 97.1 °F (36.2 °C) (Temporal)   Resp 18

## 2024-12-16 NOTE — PATIENT INSTRUCTIONS
Discharge Instructions for  [facility]  [address] [address]  Phone: [phone] Fax: [phone]    NAME:  Lindy Beltran  YOB: 1990  MEDICAL RECORD NUMBER:  [id number]  DATE:  December 16, 2024    WOUND CARE ORDERS:  Wound - Cleanse with baby shampoo. Rinse and pat dry. Apply xeroform to wound. Cover with silicone border foam. Apply double layer tubigrip size \"F\". Change dressing every other day.     We recommend wearing your compression stockings (15-20mmHg) daily.    Please keep your doctor appt on 1/9/25 for high BP.    Activity:  [x] Elevate leg(s) above the level of the heart when sitting.  [x] Avoid prolonged standing in one place.   [x] Do no get dressing/wrap wet.       Dietary:  [x] Diet as tolerated      [] Diabetic Diet            [x] Increase Protein: examples (Meat, cheese, eggs, greek yogurt, fish, nuts)          [x] Sky Therapeutic Nutrition Powder  [] Other:       Return Appointment:  [x] Return Appointment: With Dr. Deja Barbour in 3 weeks.   [] Nurse Visit :   [] Ordered tests:          Wound Care Center Information: Should you experience any significant changes in your wound(s) or have questions about your wound care, please contact the [facility] at MONDAY - FRIDAY 8:00 am - 4:30.  If you need help with your wound outside these hours and cannot wait until we are again available, contact your PCP or go to the hospital emergency room.     PLEASE NOTE: IF YOU ARE UNABLE TO OBTAIN WOUND SUPPLIES, CONTINUE TO USE THE SUPPLIES YOU HAVE AVAILABLE UNTIL YOU ARE ABLE TO REACH US. IT IS MOST IMPORTANT TO KEEP THE WOUND COVERED AT ALL TIMES.     Physician Signature:_______________________    Date: ___________ Time:  ____________

## 2024-12-16 NOTE — PROGRESS NOTES
[facility]   [facility] and [facility]   Medical Staff Progress Note     Lindy Beltran  MEDICAL RECORD NUMBER:  [id number]  AGE: 34 y.o.   GENDER: female  : 1990  EPISODE DATE:  2024    Chief complaint and reason for visit:     Chief Complaint   Patient presents for follow up of her new left lower leg wound secondary to trauma     Wound Check     \"Left leg wound\"      Patient presenting for follow up evaluation of wound(s) per chief complaint.      Subjective and ROS: Ms Lindy Beltran is a 33 yo female with a history of uncontrolled HTN, venous insufficiency, presenting with a left lower leg wound that occurred after hitting a stone wall a week ago.  Claims to be accident prone  with history of hitting her lower leg in the past.  The area blistered initially, went to [facility], given antibiotics, Cephalexin but notes the wound has not improved. She comes to [facility] today for management.     Patient claims sharp pain of 6/10 since, sensitive, drainage, red but notes no fever, chills.  She does not smoke. Does not have DM.  Is noncompliant with her antihypertensive medication as she did not take today.  Has headaches. No visual issues, chest pains, palpitations, SOB or neurological symptoms.  Has compression stockings for her venous insufficiency but wears occasionally.       2024-  On follow up, Ms Beltran returns with no complaints.  Still with pain but slightly less, 5/10.  Claims she is now taking her antihypertensive medication regularly. BP today is 143/103.  Her regular GYN has been managing her BP but he recently left the practice and does not have a PCP.    Has no fever or chills.  Still with mild headache     2024- Seen by Dr Canseco who recommended continuing with xeroform gauze, mepilex, tubigrip.       2024-  On follow up, patient returns with elevated BP of 165/121.  Taken several times in the office:  163/127 on

## 2025-01-16 ENCOUNTER — HOSPITAL ENCOUNTER (OUTPATIENT)
Facility: HOSPITAL | Age: 35
Discharge: HOME | End: 2025-01-16
Attending: RADIOLOGY
Payer: MEDICAID

## 2025-01-16 VITALS
RESPIRATION RATE: 18 BRPM | SYSTOLIC BLOOD PRESSURE: 141 MMHG | DIASTOLIC BLOOD PRESSURE: 108 MMHG | TEMPERATURE: 97.6 F | HEART RATE: 101 BPM

## 2025-01-16 DIAGNOSIS — L97.822 NON-PRESSURE CHRONIC ULCER OF OTHER PART OF LEFT LOWER LEG WITH FAT LAYER EXPOSED (HCC): Primary | ICD-10-CM

## 2025-01-16 PROCEDURE — 99212 OFFICE O/P EST SF 10 MIN: CPT

## 2025-01-16 RX ORDER — GENTAMICIN SULFATE 1 MG/G
OINTMENT TOPICAL ONCE
Status: CANCELLED | OUTPATIENT
Start: 2025-01-16 | End: 2025-01-16

## 2025-01-16 RX ORDER — BETAMETHASONE DIPROPIONATE 0.5 MG/G
CREAM TOPICAL ONCE
Status: CANCELLED | OUTPATIENT
Start: 2025-01-16 | End: 2025-01-16

## 2025-01-16 RX ORDER — TRIAMCINOLONE ACETONIDE 1 MG/G
OINTMENT TOPICAL ONCE
Status: CANCELLED | OUTPATIENT
Start: 2025-01-16 | End: 2025-01-16

## 2025-01-16 RX ORDER — GINSENG 100 MG
CAPSULE ORAL ONCE
Status: CANCELLED | OUTPATIENT
Start: 2025-01-16 | End: 2025-01-16

## 2025-01-16 RX ORDER — NEOMYCIN/BACITRACIN/POLYMYXINB 3.5-400-5K
OINTMENT (GRAM) TOPICAL ONCE
Status: CANCELLED | OUTPATIENT
Start: 2025-01-16 | End: 2025-01-16

## 2025-01-16 RX ORDER — MUPIROCIN 20 MG/G
OINTMENT TOPICAL ONCE
Status: CANCELLED | OUTPATIENT
Start: 2025-01-16 | End: 2025-01-16

## 2025-01-16 RX ORDER — CLOBETASOL PROPIONATE 0.5 MG/G
OINTMENT TOPICAL ONCE
Status: CANCELLED | OUTPATIENT
Start: 2025-01-16 | End: 2025-01-16

## 2025-01-16 RX ORDER — SILVER SULFADIAZINE 10 MG/G
CREAM TOPICAL ONCE
Status: CANCELLED | OUTPATIENT
Start: 2025-01-16 | End: 2025-01-16

## 2025-01-16 RX ORDER — BACITRACIN ZINC AND POLYMYXIN B SULFATE 500; 1000 [USP'U]/G; [USP'U]/G
OINTMENT TOPICAL ONCE
Status: CANCELLED | OUTPATIENT
Start: 2025-01-16 | End: 2025-01-16

## 2025-01-16 RX ORDER — SODIUM CHLOR/HYPOCHLOROUS ACID 0.033 %
SOLUTION, IRRIGATION IRRIGATION ONCE
Status: CANCELLED | OUTPATIENT
Start: 2025-01-16 | End: 2025-01-16

## 2025-01-16 NOTE — FLOWSHEET NOTE
01/16/25 0947   Peripheral Vascular   LLE Edema None   LLE Neurovascular Assessment   Capillary Refill Less than/Equal to 3 seconds   Color Appropriate for Ethnicity   Temperature Warm   LLE Sensation  Full sensation   L Pedal Pulse +2   Wound 10/17/24 Pretibial Left   Date First Assessed/Time First Assessed: 10/17/24 0856   Wound Approximate Age at First Assessment (Weeks): 1 weeks  Location: Pretibial  Wound Location Orientation: Left   Wound Image    Wound Etiology Traumatic   Dressing Status Other (Comment)  (LINNETTE)   Wound Cleansed Cleansed with saline   Offloading for Diabetic Foot Ulcers Offloading not ordered   Wound Length (cm) 0.1 cm   Wound Width (cm) 0.1 cm   Wound Depth (cm) 0.1 cm   Wound Surface Area (cm^2) 0.01 cm^2   Change in Wound Size % (l*w) 99.93   Wound Volume (cm^3) 0.001 cm^3   Wound Healing % 100   Wound Assessment Epithelialization   Drainage Amount None (dry)   Odor None   Glenys-wound Assessment Fragile;Intact   Margins Flat/open edges   Pain Assessment   Pain Assessment None - Denies Pain     BP (!) 141/108   Pulse (!) 101   Temp 97.6 °F (36.4 °C) (Temporal)   Resp 18

## 2025-01-16 NOTE — PATIENT INSTRUCTIONS
Discharge Instructions for  [facility]  [address] [address]  Phone: [phone] Fax: [phone]    NAME:  Lindy Beltran  YOB: 1990  MEDICAL RECORD NUMBER:  [id number]  DATE:  January 16, 2025    WOUND CARE ORDERS:  We recommend wearing your compression stockings (15-20mmHg) daily.    Please make an appointment to see your PCP about your continued elevated blood pressures.     Activity:  [x] Elevate leg(s) above the level of the heart when sitting.  [x] Avoid prolonged standing in one place.   [x] Do no get dressing/wrap wet.       Dietary:  [x] Diet as tolerated      [] Diabetic Diet            [] Increase Protein: examples (Meat, cheese, eggs, greek yogurt, fish, nuts)          [] Sky Therapeutic Nutrition Powder  [] Other:       Return Appointment:  [x] Return Appointment: Follow up as needed.   [] Nurse Visit :   [] Ordered tests:          Wound Care Center Information: Should you experience any significant changes in your wound(s) or have questions about your wound care, please contact the [facility] at MONDAY - FRIDAY 8:00 am - 4:30.  If you need help with your wound outside these hours and cannot wait until we are again available, contact your PCP or go to the hospital emergency room.     PLEASE NOTE: IF YOU ARE UNABLE TO OBTAIN WOUND SUPPLIES, CONTINUE TO USE THE SUPPLIES YOU HAVE AVAILABLE UNTIL YOU ARE ABLE TO REACH US. IT IS MOST IMPORTANT TO KEEP THE WOUND COVERED AT ALL TIMES.     Physician Signature:_______________________    Date: ___________ Time:  ____________

## 2025-01-18 NOTE — PROGRESS NOTES
[facility]   Wound Care and [facility] Therapy Center   Medical Staff Progress Note     Lindy Beltran  MEDICAL RECORD NUMBER:  [id number]  AGE: 34 y.o.   GENDER: female  : 1990  EPISODE DATE:  2025    Chief complaint and reason for visit:     Chief Complaint   Patient presents for follow up of her new left lower leg wound secondary to trauma     Follow-up      Patient presenting for follow up evaluation of wound(s) per chief complaint.      Subjective and ROS:    Ms Lindy Beltran is a 35 yo female with a history of uncontrolled HTN, venous insufficiency, presenting with a left lower leg wound that occurred after hitting a stone wall a week ago.  Claims to be accident prone  with history of hitting her lower leg in the past.  The area blistered initially, went to [facility], given antibiotics, Cephalexin but notes the wound has not improved. She comes to [facility] today for management.     Patient claims sharp pain of 6/10 since, sensitive, drainage, red but notes no fever, chills.  She does not smoke. Does not have DM.  Is noncompliant with her antihypertensive medication as she did not take today.  Has headaches. No visual issues, chest pains, palpitations, SOB or neurological symptoms.  Has compression stockings for her venous insufficiency but wears occasionally.       2024-  On follow up, Ms Beltran returns with no complaints.  Still with pain but slightly less, 5/10.  Claims she is now taking her antihypertensive medication regularly. BP today is 143/103.  Her regular GYN has been managing her BP but he recently left the practice and does not have a PCP.    Has no fever or chills.  Still with mild headache     2024- Seen by Dr Canseco who recommended continuing with xeroform gauze, mepilex, tubigrip.       2024-  On follow up, patient returns with elevated BP of 165/121.  Taken several times in the office:  163/127 on arrival, 151/ 117 last